# Patient Record
Sex: FEMALE | Race: WHITE | NOT HISPANIC OR LATINO | Employment: OTHER | URBAN - NONMETROPOLITAN AREA
[De-identification: names, ages, dates, MRNs, and addresses within clinical notes are randomized per-mention and may not be internally consistent; named-entity substitution may affect disease eponyms.]

---

## 2017-01-03 ENCOUNTER — OFFICE VISIT (OUTPATIENT)
Dept: NEUROLOGY | Facility: CLINIC | Age: 27
End: 2017-01-03

## 2017-01-03 VITALS
SYSTOLIC BLOOD PRESSURE: 118 MMHG | DIASTOLIC BLOOD PRESSURE: 74 MMHG | WEIGHT: 193 LBS | BODY MASS INDEX: 37.89 KG/M2 | HEART RATE: 74 BPM | HEIGHT: 60 IN

## 2017-01-03 DIAGNOSIS — Q02 MICROCEPHALY (HCC): ICD-10-CM

## 2017-01-03 DIAGNOSIS — G80.4 ATAXIC CEREBRAL PALSY (HCC): Primary | ICD-10-CM

## 2017-01-03 DIAGNOSIS — R56.9 SEIZURES (HCC): ICD-10-CM

## 2017-01-03 DIAGNOSIS — G44.89 OTHER HEADACHE SYNDROME: ICD-10-CM

## 2017-01-03 PROCEDURE — 99213 OFFICE O/P EST LOW 20 MIN: CPT | Performed by: PHYSICIAN ASSISTANT

## 2017-01-03 NOTE — MR AVS SNAPSHOT
Fiona Clay   1/3/2017 10:20 AM   Office Visit    Dept Phone:  [phone]   Encounter #:  [id number]    Provider:  TOM Conklin   Department:  [facility] NEUROLOGY                Your Full Care Plan              Your Updated Medication List          This list is accurate as of: 1/3/17 11:21 AM.  Always use your most recent med list.                CloNIDine 0.1 MG tablet   Commonly known as:  CATAPRES       PHENobarbital 20 MG/5ML solution oral solution               We Performed the Following     Ambulatory Referral to Family Practice       You Were Diagnosed With        Codes Comments    Ataxic cerebral palsy    -  Primary ICD-10-CM: G80.4  ICD-9-CM: 343.9     Microcephaly     ICD-10-CM: Q02  ICD-9-CM: 742.1     Seizures     ICD-10-CM: R56.9  ICD-9-CM: 780.39     Other headache syndrome     ICD-10-CM: G44.89  ICD-9-CM: 339.89       Instructions     None    Patient Instructions History      Upcoming Appointments     Visit Type Date Time Department    FOLLOW UP 1/3/2017 10:20 AM [facility] NEUROLOGY SPE PAD    FOLLOW UP 7/3/2017 10:40 AM [facility] NEUROLOGY SPE PAD      MyChart Signup     [facility] [employer] allows you to send messages to your doctor, view your test results, renew your prescriptions, schedule appointments, and more. To sign up, go to [employer] and click on the Sign Up Now link in the New User? box. Enter your [employer] Activation Code exactly as it appears below along with the last four digits of your Social Security Number and your Date of Birth () to complete the sign-up process. If you do not sign up before the expiration date, you must request a new code.    [employer] Activation Code: [email or web address]  Expires: 2017 11:19 AM    If you have questions, you can email [email or web address] or call [phone] to talk to our [employer] staff. Remember, [employer] is NOT to be used for urgent needs. For medical  "emergencies, dial 911.               Other Info from Your Visit           Your Appointments     Jul 03, 2017 10:40 AM CDT   Follow Up with TOM Conklin   [facility] NEUROLOGY (--)    [address] [place] [id number] [phone]           Arrive 15 minutes prior to appointment.              Allergies     Depakote [Valproic Acid]      Penicillins        Reason for Visit     Seizures No seizure since last visit. No new concerns or complaints.       Vital Signs     Blood Pressure Pulse Height Weight Body Mass Index Smoking Status    118/74 74 60\" (152.4 cm) 193 lb (87.5 kg) 37.69 kg/m2 Never Smoker      Problems and Diagnoses Noted     Cerebral palsy    Small head    Seizure    Other headache syndrome            "

## 2017-01-06 NOTE — PROGRESS NOTES
Subjective   Fiona Clay is a 26 y.o. female is here today for follow-up.    HPI Comments: This patient with developmental delay, neurocognitive defects, microcephaly, seizure disorder, cerebral palsy has been doing reasonably well with no seizures but is had increased complaints of ataxia and headache.    Seizures    This is a chronic problem. Episode onset: Childhood onset, associated with neurocognitive defects likely cerebral palsy. The problem has not changed since onset.There were more than 10 seizures. The most recent episode lasted 30 to 120 seconds. Associated symptoms include headaches and visual disturbances. Pertinent negatives include no speech difficulty, no chest pain, no cough, no nausea and no vomiting. The episode was witnessed. Aura: Unknown. The seizure(s) had no focality.   Headache    This is a recurrent problem. The current episode started more than 1 month ago. The problem occurs intermittently. The problem has been waxing and waning. The pain is located in the bilateral region. The pain does not radiate. The pain quality is not similar to prior headaches. The quality of the pain is described as aching. The pain is severe. Associated symptoms include hearing loss, seizures and weakness. Pertinent negatives include no abdominal pain, coughing, dizziness, ear pain, eye pain, fever, nausea, photophobia or vomiting. Nothing aggravates the symptoms. She has tried acetaminophen and NSAIDs for the symptoms. The treatment provided moderate relief.       The following portions of the patient's history were reviewed and updated as appropriate: allergies, current medications, past family history, past medical history, past social history, past surgical history and problem list.    Review of Systems   Constitutional: Negative for chills, fatigue and fever.   HENT: Positive for hearing loss. Negative for ear pain, nosebleeds and trouble swallowing.    Eyes: Positive for visual disturbance.  Negative for photophobia and pain.   Respiratory: Negative for cough, shortness of breath and wheezing.    Cardiovascular: Negative for chest pain and palpitations.   Gastrointestinal: Negative for abdominal pain, nausea and vomiting.   Endocrine: Negative.    Genitourinary: Negative.    Musculoskeletal: Negative.    Skin: Negative.    Allergic/Immunologic: Negative.    Neurological: Positive for seizures, weakness and headaches. Negative for dizziness and speech difficulty.        Ataxia   Hematological: Does not bruise/bleed easily.   Psychiatric/Behavioral: Negative for behavioral problems, dysphoric mood and sleep disturbance. The patient is not nervous/anxious.        Objective   Physical Exam   Constitutional: She is oriented to person, place, and time. Vital signs are normal. She appears well-developed and well-nourished. No distress.   HENT:   Head: Atraumatic.   Right Ear: External ear normal. Decreased hearing is noted.   Left Ear: External ear normal. Decreased hearing is noted.   Nose: Nose normal.   Mouth/Throat: Uvula is midline, oropharynx is clear and moist and mucous membranes are normal. Abnormal dentition.   Microcephaly   Eyes: Conjunctivae, EOM and lids are normal. Pupils are equal, round, and reactive to light. No scleral icterus.   Morphologically normal, she was too excited for funduscopic exam today   Neck: Normal range of motion and phonation normal. No spinous process tenderness and no muscular tenderness present. Carotid bruit is not present. Normal range of motion present. No thyroid mass and no thyromegaly present.   Cardiovascular: Normal rate, regular rhythm, S1 normal, S2 normal and normal heart sounds.    No murmur heard.  Pulmonary/Chest: Effort normal and breath sounds normal. No stridor. No respiratory distress. She has no wheezes. She has no rales.   Abdominal: Soft. Normal appearance. There is no tenderness.   Musculoskeletal: Normal range of motion. She exhibits no edema or  tenderness.        Lumbar back: Normal.   Lymphadenopathy:     She has no cervical adenopathy.   Neurological: She is alert and oriented to person, place, and time. She has normal strength. She displays abnormal reflex. She displays no atrophy and no tremor. A cranial nerve deficit is present. No sensory deficit. She exhibits abnormal muscle tone. Coordination abnormal. Gait normal. GCS eye subscore is 4. GCS verbal subscore is 5. GCS motor subscore is 6.   Reflex Scores:       Tricep reflexes are 3+ on the right side and 2+ on the left side.       Bicep reflexes are 3+ on the right side and 2+ on the left side.       Brachioradialis reflexes are 3+ on the right side and 2+ on the left side.       Patellar reflexes are 3+ on the right side and 2+ on the left side.       Achilles reflexes are 3+ on the right side and 2+ on the left side.  Diminished vision bilaterally and mannerisms consistent with diminished hearing bilaterally, not formally tested or evaluated today but at her known baseline.  Right-sided ataxia, mild increase in tone in reflexes on the right versus left with some general weakness on the right versus left.   Skin: Skin is warm and dry. No ecchymosis, no lesion and no rash noted. No cyanosis. Nails show no clubbing.   Psychiatric: Her speech is normal. Thought content normal. Her affect is labile. She is agitated and hyperactive. She is not actively hallucinating. Cognition and memory are impaired. She expresses impulsivity.   Mental status today is at baseline.  She is generally happy and engaging though quite superficial in conversation. She is inattentive.   Nursing note and vitals reviewed.        Assessment/Plan   Fiona was seen today for seizures.    Diagnoses and all orders for this visit:    Ataxic cerebral palsy  -     MRI Brain Without Contrast; Future  -     Ambulatory Referral to Family Practice    Microcephaly  -     MRI Brain Without Contrast; Future  -     Ambulatory Referral to  Family Practice    Seizures  -     MRI Brain Without Contrast; Future  -     Ambulatory Referral to Family Practice    Other headache syndrome  -     MRI Brain Without Contrast; Future  -     Ambulatory Referral to Family Practice      Given the patient's limited capacity to articulate her complaints in changes of diminished neuro status, increased ataxia and increased headaches superimposed on a complex history of cerebral palsy, microcephaly and seizure disorder, we have recommended that she proceed with an MRI of the brain without contrast.    The patient does not presently have a family medicine specialist and we have recommended referral to family medicine.  This referral was to establish care.    10 minutes of an outpatient 20 minute visit is spent in counseling and coordination of care with the patient and family today.        EMR Dragon transcription disclaimer:  Much of this encounter note is an electronic transcription/translation of spoken language to printed text.  The electronic translation of spoken language may permit erroneous, or at times, nonsensical words or phrases to be inadvertently transcribed.  The author has reviewed the note for such errors, however some may still exist.

## 2017-01-18 ENCOUNTER — APPOINTMENT (OUTPATIENT)
Dept: MRI IMAGING | Facility: HOSPITAL | Age: 27
End: 2017-01-18

## 2017-01-24 ENCOUNTER — OFFICE VISIT (OUTPATIENT)
Dept: INTERNAL MEDICINE | Facility: CLINIC | Age: 27
End: 2017-01-24

## 2017-01-24 VITALS
DIASTOLIC BLOOD PRESSURE: 84 MMHG | HEART RATE: 86 BPM | BODY MASS INDEX: 39.39 KG/M2 | WEIGHT: 195.4 LBS | SYSTOLIC BLOOD PRESSURE: 122 MMHG | HEIGHT: 59 IN

## 2017-01-24 DIAGNOSIS — E66.09 NON MORBID OBESITY DUE TO EXCESS CALORIES: ICD-10-CM

## 2017-01-24 DIAGNOSIS — R56.9 SEIZURES (HCC): Primary | ICD-10-CM

## 2017-01-24 DIAGNOSIS — Z00.00 ENCOUNTER FOR PREVENTIVE HEALTH EXAMINATION: ICD-10-CM

## 2017-01-24 PROCEDURE — 99203 OFFICE O/P NEW LOW 30 MIN: CPT | Performed by: INTERNAL MEDICINE

## 2017-01-24 NOTE — MR AVS SNAPSHOT
Fiona THOMPSON Obed   2017 8:00 AM   Office Visit    Dept Phone:  [phone]   Encounter #:  [id number]    Provider:  Vishal Fox DO   Department:  [facility] FAMILY MEDICINE                Your Full Care Plan              Your Updated Medication List          This list is accurate as of: 17  9:06 AM.  Always use your most recent med list.                CloNIDine 0.1 MG tablet   Commonly known as:  CATAPRES       PHENobarbital 20 MG/5ML solution oral solution               We Performed the Following     Ambulatory Referral to Obstetrics / Gynecology       You Were Diagnosed With        Codes Comments    Seizures    -  Primary ICD-10-CM: R56.9  ICD-9-CM: 780.39     Non morbid obesity due to excess calories     ICD-10-CM: E66.09  ICD-9-CM: 278.00     Encounter for preventive health examination     ICD-10-CM: Z00.00  ICD-9-CM: V70.0       Instructions     None    Patient Instructions History      Upcoming Appointments     Visit Type Date Time Department    NEW PATIENT 2017  8:00 AM [facility]    MR PAD BRAIN WO CONTRAST 2017 10:30 AM  PAD MRI [facility]    FOLLOW UP 7/3/2017 10:40 AM [facility] NEUROLOGY SPE PAD      MyChart Signup     [facility] [employer] allows you to send messages to your doctor, view your test results, renew your prescriptions, schedule appointments, and more. To sign up, go to [employer] and click on the Sign Up Now link in the New User? box. Enter your [employer] Activation Code exactly as it appears below along with the last four digits of your Social Security Number and your Date of Birth () to complete the sign-up process. If you do not sign up before the expiration date, you must request a new code.    [employer] Activation Code: [email or web address]  Expires: 2017  9:06 AM    If you have questions, you can email [email or web address] or call [phone] to talk to our [employer] staff. Remember, [employer]  "is NOT to be used for urgent needs. For medical emergencies, dial 911.               Other Info from Your Visit           Your Appointments     Jan 27, 2017 10:30 AM CST   MR pad brain wo contrast with PAD BIC MRI 2   [facility] ([place])    [address] [facility] [id number] [phone]           Arrive 30 minutes prior to exam time. Bring a list of medications currently taking. Wear clothing without metal snaps, zippers, or other metalic artifacts. Do not wear jewelry.            Jul 03, 2017 10:40 AM CDT   Follow Up with TOM Conklin   [facility] NEUROLOGY (--)    [address] [place] [address] [address]  [facility] [address] [phone]           Arrive 15 minutes prior to appointment.              Allergies     Depakote [Valproic Acid]      Penicillins        Vital Signs     Blood Pressure Pulse Height Weight Body Mass Index Smoking Status    122/84 86 59\" (149.9 cm) 195 lb 6.4 oz (88.6 kg) 39.47 kg/m2 Never Smoker      Problems and Diagnoses Noted     Non morbid obesity due to excess calories    Seizure    Preventative health care            "

## 2017-01-24 NOTE — PROGRESS NOTES
"CC: establish care for seizures and obesity    History:  Fiona Clay is a 26 y.o. female who presents today for evaluation of the above problems.  She reports she has been doing well and has not been sick in almost a year.  She continues on phenobarbital for history of seizures, though her last seizure was when she was 9 years of age.  She continues to follow with neurology and has no complaints currently.  She takes clonidine to help her with sleeping on a very rare occasion.  Regarding her weight, she states she enjoys eating things like pizza, lasagna, spaghetti.  The only vegetables she enjoys are peas, she does get fruit on a regular basis.  Regarding exercise, she states she gets it when \"forced.\"  She does drink regular Dr. Pepper daily.    ROS:  Review of Systems   Constitutional: Negative for chills and fever.   HENT: Negative for congestion and sore throat.    Eyes: Negative for visual disturbance.   Respiratory: Negative for cough and shortness of breath.    Cardiovascular: Negative for chest pain and palpitations.   Gastrointestinal: Negative for abdominal pain, constipation and nausea.   Endocrine: Negative for cold intolerance and heat intolerance.   Genitourinary: Negative for difficulty urinating and frequency.   Musculoskeletal: Negative for arthralgias and back pain.   Skin: Negative for rash.   Neurological: Negative for dizziness and headaches.   Psychiatric/Behavioral: Negative for dysphoric mood. The patient is not nervous/anxious.        Allergies   Allergen Reactions   • Depakote [Valproic Acid]    • Penicillins      Past Medical History   Diagnosis Date   • Cerebral palsy    • Hearing loss      slight   • Mental retardation    • Microcephaly    • Poor eyesight    • Seizures    • Stroke    • Vision impairment      Past Surgical History   Procedure Laterality Date   • Tympanostomy tube placement       Family History   Problem Relation Age of Onset   • Alcohol abuse Mother    • " "Arthritis Mother    • Depression Mother    • No Known Problems Sister    • No Known Problems Brother    • No Known Problems Brother    • No Known Problems Sister    • No Known Problems Sister    • No Known Problems Sister    • Alcohol abuse Maternal Grandmother    • Arthritis Maternal Grandmother    • Cancer Maternal Grandmother    • Alcohol abuse Maternal Grandfather    • Arthritis Maternal Grandfather    • Depression Maternal Grandfather    • Diabetes Maternal Grandfather       reports that she has never smoked. She has never used smokeless tobacco. She reports that she does not drink alcohol or use illicit drugs.      Current Outpatient Prescriptions:   •  CloNIDine (CATAPRES) 0.1 MG tablet, Take 0.1 mg by mouth At Night As Needed (sleep)., Disp: , Rfl:   •  PHENobarbital 20 MG/5ML solution oral solution, Take 19 mL by mouth Daily., Disp: , Rfl:     OBJECTIVE:  Visit Vitals   • /84   • Pulse 86   • Ht 59\" (149.9 cm)   • Wt 195 lb 6.4 oz (88.6 kg)   • BMI 39.47 kg/m2      Physical Exam   Constitutional: She is oriented to person, place, and time. She appears well-developed and well-nourished. No distress.   HENT:   Head: Normocephalic and atraumatic.   Right Ear: External ear normal.   Left Ear: External ear normal.   Nose: Nose normal.   Mouth/Throat: Oropharynx is clear and moist. No oropharyngeal exudate.   Eyes: EOM are normal. No scleral icterus.   Neck: Normal range of motion. Neck supple.   Cardiovascular: Normal rate, regular rhythm and normal heart sounds.    No murmur heard.  Pulmonary/Chest: Effort normal and breath sounds normal. No accessory muscle usage. No respiratory distress. She has no wheezes.   Abdominal: Soft. Bowel sounds are normal. She exhibits no distension. There is no tenderness.   Musculoskeletal: Normal range of motion.   Lymphadenopathy:     She has no cervical adenopathy.   Neurological: She is alert and oriented to person, place, and time. Coordination and gait normal.   Skin: " Skin is warm and dry. No cyanosis. Nails show no clubbing.   No jaundice   Psychiatric: She has a normal mood and affect. Her mood appears not anxious. She does not exhibit a depressed mood.       Assessment/Plan    Diagnoses and all orders for this visit:    Seizures  Stable and well-controlled on phenobarbital.  This is managed by neurology.    Non morbid obesity due to excess calories  We discussed the importance of portion control and being careful about the types of food she chooses.  I also encouraged her to get some regular exercise.    Encounter for preventive health examination  -     Ambulatory Referral to Obstetrics / Gynecology  -     Comprehensive Metabolic Panel; Future  -     Hemoglobin A1c; Future  -     Lipid Panel; Future  We will check screening labs as above in light of obesity and refer to OB/GYN for screening PAP.      An After Visit Summary was printed and given to the patient at discharge.  Return in about 1 year (around 1/24/2018) for Annual physical.         Vishal Fox D.O. 1/24/2017

## 2017-02-09 ENCOUNTER — TELEPHONE (OUTPATIENT)
Dept: NEUROLOGY | Facility: CLINIC | Age: 27
End: 2017-02-09

## 2017-02-09 RX ORDER — PHENOBARBITAL 20 MG/5ML
76 LIQUID ORAL DAILY
Qty: 450 ML | Refills: 5 | Status: SHIPPED | OUTPATIENT
Start: 2017-02-09 | End: 2017-09-18 | Stop reason: SDUPTHER

## 2017-02-09 NOTE — TELEPHONE ENCOUNTER
I did call to let Cathi know that Fiona's Phenobarbital has been called in to the [facility] on [address].

## 2017-04-25 RX ORDER — CLONIDINE HYDROCHLORIDE 0.1 MG/1
0.1 TABLET ORAL NIGHTLY PRN
Qty: 30 TABLET | Refills: 5 | Status: SHIPPED | OUTPATIENT
Start: 2017-04-25 | End: 2018-03-30 | Stop reason: SDUPTHER

## 2017-07-07 ENCOUNTER — LAB (OUTPATIENT)
Dept: LAB | Facility: HOSPITAL | Age: 27
End: 2017-07-07
Attending: INTERNAL MEDICINE

## 2017-07-07 ENCOUNTER — OFFICE VISIT (OUTPATIENT)
Dept: INTERNAL MEDICINE | Facility: CLINIC | Age: 27
End: 2017-07-07

## 2017-07-07 VITALS
HEIGHT: 59 IN | RESPIRATION RATE: 18 BRPM | BODY MASS INDEX: 39.55 KG/M2 | OXYGEN SATURATION: 95 % | HEART RATE: 77 BPM | DIASTOLIC BLOOD PRESSURE: 78 MMHG | WEIGHT: 196.2 LBS | SYSTOLIC BLOOD PRESSURE: 122 MMHG

## 2017-07-07 DIAGNOSIS — B37.2 CANDIDAL INTERTRIGO: ICD-10-CM

## 2017-07-07 DIAGNOSIS — Z00.00 ENCOUNTER FOR PREVENTIVE HEALTH EXAMINATION: ICD-10-CM

## 2017-07-07 DIAGNOSIS — E66.09 NON MORBID OBESITY DUE TO EXCESS CALORIES: ICD-10-CM

## 2017-07-07 DIAGNOSIS — R56.9 SEIZURES (HCC): ICD-10-CM

## 2017-07-07 DIAGNOSIS — G80.4 ATAXIC CEREBRAL PALSY (HCC): ICD-10-CM

## 2017-07-07 DIAGNOSIS — Z00.00 ENCOUNTER FOR PREVENTIVE HEALTH EXAMINATION: Primary | ICD-10-CM

## 2017-07-07 LAB
ALBUMIN SERPL-MCNC: 4.3 G/DL (ref 3.5–5)
ALBUMIN/GLOB SERPL: 1.2 G/DL (ref 1.1–2.5)
ALP SERPL-CCNC: 113 U/L (ref 24–120)
ALT SERPL W P-5'-P-CCNC: 54 U/L (ref 0–54)
ANION GAP SERPL CALCULATED.3IONS-SCNC: 11 MMOL/L (ref 4–13)
ARTICHOKE IGE QN: 85 MG/DL (ref 0–99)
AST SERPL-CCNC: 39 U/L (ref 7–45)
BILIRUB SERPL-MCNC: 0.3 MG/DL (ref 0.1–1)
BUN BLD-MCNC: 9 MG/DL (ref 5–21)
BUN/CREAT SERPL: 14.8 (ref 7–25)
CALCIUM SPEC-SCNC: 8.9 MG/DL (ref 8.4–10.4)
CHLORIDE SERPL-SCNC: 103 MMOL/L (ref 98–110)
CHOLEST SERPL-MCNC: 148 MG/DL (ref 130–200)
CO2 SERPL-SCNC: 25 MMOL/L (ref 24–31)
CREAT BLD-MCNC: 0.61 MG/DL (ref 0.5–1.4)
GFR SERPL CREATININE-BSD FRML MDRD: 119 ML/MIN/1.73
GLOBULIN UR ELPH-MCNC: 3.6 GM/DL
GLUCOSE BLD-MCNC: 81 MG/DL (ref 70–100)
HBA1C MFR BLD: 5.3 %
HDLC SERPL-MCNC: 45 MG/DL
LDLC/HDLC SERPL: 1.88 {RATIO}
POTASSIUM BLD-SCNC: 3.8 MMOL/L (ref 3.5–5.3)
PROT SERPL-MCNC: 7.9 G/DL (ref 6.3–8.7)
SODIUM BLD-SCNC: 139 MMOL/L (ref 135–145)
TRIGL SERPL-MCNC: 92 MG/DL (ref 0–149)

## 2017-07-07 PROCEDURE — 80061 LIPID PANEL: CPT | Performed by: INTERNAL MEDICINE

## 2017-07-07 PROCEDURE — 80053 COMPREHEN METABOLIC PANEL: CPT | Performed by: INTERNAL MEDICINE

## 2017-07-07 PROCEDURE — 99395 PREV VISIT EST AGE 18-39: CPT | Performed by: INTERNAL MEDICINE

## 2017-07-07 PROCEDURE — 83036 HEMOGLOBIN GLYCOSYLATED A1C: CPT | Performed by: INTERNAL MEDICINE

## 2017-07-07 PROCEDURE — 36415 COLL VENOUS BLD VENIPUNCTURE: CPT

## 2017-07-07 RX ORDER — NYSTATIN 100000 [USP'U]/G
POWDER TOPICAL 3 TIMES DAILY
Qty: 56.7 G | Refills: 1 | Status: SHIPPED | OUTPATIENT
Start: 2017-07-07 | End: 2018-03-30

## 2017-07-07 NOTE — PROGRESS NOTES
"CC: follow-up obesity    History:  Fiona Clay is a 26 y.o. female who presents today for follow-up for evaluation of the above:  She reports she has been feeling well without acute illness. She has not been very active and prefers to stay inside to listen to music. She has a rash under her breasts and within the folds of her skin that has been irritating her. She continues on phenobarbital with no seizures.    ROS:  Review of Systems   Constitutional: Negative for chills and fever.   Respiratory: Negative for cough and shortness of breath.    Cardiovascular: Negative for chest pain, palpitations and leg swelling.       Ms. Clay  reports that she has never smoked. She has never used smokeless tobacco. She reports that she does not drink alcohol or use illicit drugs.      Current Outpatient Prescriptions:   •  CloNIDine (CATAPRES) 0.1 MG tablet, Take 1 tablet by mouth At Night As Needed (sleep)., Disp: 30 tablet, Rfl: 5  •  PHENobarbital 20 MG/5ML solution oral solution, Take 19 mL by mouth Daily., Disp: 450 mL, Rfl: 5      OBJECTIVE:  /78  Pulse 77  Resp 18  Ht 59\" (149.9 cm)  Wt 196 lb 3.2 oz (89 kg)  SpO2 95%  BMI 39.63 kg/m2   Physical Exam   Constitutional: She is oriented to person, place, and time. She appears well-nourished. No distress.   Cardiovascular: Normal rate, regular rhythm and normal heart sounds.    No murmur heard.  Pulmonary/Chest: Effort normal and breath sounds normal. She has no wheezes.   Abdominal: Soft. There is no tenderness.   Musculoskeletal: She exhibits no edema.   Neurological: She is alert and oriented to person, place, and time.   Psychiatric: She has a normal mood and affect.       Assessment/Plan    Diagnoses and all orders for this visit:    Encounter for preventive health examination  Immunizations:      - Tetanus: Unknown or >10 years ago. Recommend to have at pharmacy or on injury.      - Influenza: Received in 2016      - Pneumovax: Once after age " 65      - Prevnar: Once after age 65      - Zostavax: Once after age 60  Colonoscopy: Due at age 50  Mammogram: Due at 50. Reports a breast lesion. Will defer to GYN.  PAP: Has referral to GYN. Recommend follow-up there given her microcephaly and their expertise in this procedure.  DEXA: DEXA scan at 65    Non morbid obesity due to excess calories  Recommended attention to portion control and being careful about the types and timing of meals for the purpose of weight management. Also recommended increased exercise.    Seizures  Ataxic cerebral palsy  No seizures on phenobarbital.    Candidal intertrigo  -     nystatin (MYCOSTATIN) 463008 UNIT/GM powder; Apply  topically 3 (Three) Times a Day.    An After Visit Summary was printed and given to the patient at discharge.  Return in about 1 year (around 7/7/2018) for Annual physical. Sooner if problems arise.         Vsihal Fox D.O. 7/7/2017

## 2017-07-11 ENCOUNTER — OFFICE VISIT (OUTPATIENT)
Dept: OBSTETRICS AND GYNECOLOGY | Facility: CLINIC | Age: 27
End: 2017-07-11

## 2017-07-11 VITALS
BODY MASS INDEX: 39.51 KG/M2 | SYSTOLIC BLOOD PRESSURE: 124 MMHG | WEIGHT: 196 LBS | DIASTOLIC BLOOD PRESSURE: 82 MMHG | HEIGHT: 59 IN

## 2017-07-11 DIAGNOSIS — N63.0 BREAST LUMP: Primary | ICD-10-CM

## 2017-07-11 PROCEDURE — 99203 OFFICE O/P NEW LOW 30 MIN: CPT | Performed by: NURSE PRACTITIONER

## 2017-07-11 NOTE — PROGRESS NOTES
Subjective   Fiona Clay is a 26 y.o. female.     History of Present Illness    The following portions of the patient's history were reviewed and updated as appropriate: allergies, current medications, past family history, past medical history, past social history, past surgical history and problem list.    Review of Systems   Constitutional: Negative for activity change, appetite change, chills, diaphoresis, fatigue, fever and unexpected weight change.   HENT: Negative for congestion, ear discharge, ear pain, facial swelling, hearing loss, mouth sores, nosebleeds, postnasal drip, rhinorrhea, sinus pressure, sneezing, sore throat, tinnitus, trouble swallowing and voice change.    Eyes: Negative for photophobia, pain, discharge, redness, itching and visual disturbance.   Respiratory: Negative for apnea, cough, choking, chest tightness and shortness of breath.    Cardiovascular: Negative for chest pain, palpitations and leg swelling.   Gastrointestinal: Negative for abdominal distention, abdominal pain, anal bleeding, blood in stool, constipation, diarrhea, nausea, rectal pain and vomiting.   Endocrine: Negative for cold intolerance and heat intolerance.   Genitourinary: Negative for decreased urine volume, difficulty urinating, dyspareunia, flank pain, frequency, genital sores, hematuria, menstrual problem, pelvic pain, urgency, vaginal bleeding, vaginal discharge and vaginal pain.   Musculoskeletal: Negative for arthralgias, back pain, joint swelling and myalgias.   Skin: Negative for color change and rash.   Allergic/Immunologic: Negative for environmental allergies.   Neurological: Negative for dizziness, syncope, weakness, numbness and headaches.   Hematological: Negative for adenopathy.   Psychiatric/Behavioral: Negative for agitation, confusion and sleep disturbance. The patient is not nervous/anxious.        Objective   Physical Exam   Constitutional: She is oriented to person, place, and time. She  appears well-developed and well-nourished.   Cardiovascular: Normal rate and regular rhythm.    Pulmonary/Chest: Effort normal and breath sounds normal. Right breast exhibits inverted nipple. Right breast exhibits no mass, no nipple discharge, no skin change and no tenderness. Left breast exhibits inverted nipple. Left breast exhibits no mass, no nipple discharge, no skin change and no tenderness.       Neurological: She is alert and oriented to person, place, and time.   Psychiatric: She has a normal mood and affect. Her behavior is normal.   Nursing note and vitals reviewed.      Assessment/Plan   Fiona was seen today for breast mass.    Diagnoses and all orders for this visit:    Breast lump  Comments:  Patient has cerebral palsy and microcephaly.  A nurse at [facility] found a lump in her left breast and referred her to me.  There was a small indention in the left breast in the area of concern only when patient is lying.  No distinct knot or mass palpated.  Mammogram with US if indicated ordered.  Will follow up pending results.    Orders:  -     Mammo Diagnostic Left With CAD; Future

## 2017-07-12 DIAGNOSIS — G80.4 ATAXIC CEREBRAL PALSY (HCC): Primary | ICD-10-CM

## 2017-07-12 DIAGNOSIS — R56.9 SEIZURES (HCC): ICD-10-CM

## 2017-07-12 DIAGNOSIS — Q02 MICROCEPHALY (HCC): ICD-10-CM

## 2017-07-18 ENCOUNTER — HOSPITAL ENCOUNTER (OUTPATIENT)
Dept: MAMMOGRAPHY | Facility: HOSPITAL | Age: 27
Discharge: HOME | End: 2017-07-18
Admitting: NURSE PRACTITIONER

## 2017-07-18 ENCOUNTER — HOSPITAL ENCOUNTER (OUTPATIENT)
Dept: ULTRASOUND IMAGING | Facility: HOSPITAL | Age: 27
Discharge: HOME | End: 2017-07-18

## 2017-07-18 DIAGNOSIS — N63.0 BREAST LUMP: ICD-10-CM

## 2017-07-18 PROCEDURE — 76642 ULTRASOUND BREAST LIMITED: CPT

## 2017-09-08 ENCOUNTER — TELEPHONE (OUTPATIENT)
Dept: OBSTETRICS AND GYNECOLOGY | Facility: CLINIC | Age: 27
End: 2017-09-08

## 2017-09-08 DIAGNOSIS — N92.0 MENORRHAGIA WITH REGULAR CYCLE: Primary | ICD-10-CM

## 2017-09-08 RX ORDER — NORETHINDRONE ACETATE AND ETHINYL ESTRADIOL 1MG-20(21)
1 KIT ORAL DAILY
Qty: 28 TABLET | Refills: 10 | Status: SHIPPED | OUTPATIENT
Start: 2017-09-08 | End: 2018-03-30

## 2017-09-08 NOTE — TELEPHONE ENCOUNTER
Mother calls stating that they received from you 2 samples of Lo Lo, and need a refill. I do not see any notation of this or discussion of contraception.  Please review

## 2017-09-25 RX ORDER — PHENOBARBITAL 20 MG/5ML
76 LIQUID ORAL DAILY
Qty: 450 ML | Refills: 2 | OUTPATIENT
Start: 2017-09-25 | End: 2018-01-02 | Stop reason: SDUPTHER

## 2018-01-02 RX ORDER — PHENOBARBITAL 20 MG/5ML
76 LIQUID ORAL DAILY
Qty: 450 ML | Refills: 2 | OUTPATIENT
Start: 2018-01-02 | End: 2018-03-30 | Stop reason: SDUPTHER

## 2018-03-30 ENCOUNTER — APPOINTMENT (OUTPATIENT)
Dept: LAB | Facility: HOSPITAL | Age: 28
End: 2018-03-30

## 2018-03-30 ENCOUNTER — OFFICE VISIT (OUTPATIENT)
Dept: NEUROLOGY | Facility: CLINIC | Age: 28
End: 2018-03-30

## 2018-03-30 VITALS
HEART RATE: 74 BPM | DIASTOLIC BLOOD PRESSURE: 80 MMHG | SYSTOLIC BLOOD PRESSURE: 124 MMHG | HEIGHT: 59 IN | WEIGHT: 200 LBS | BODY MASS INDEX: 40.32 KG/M2

## 2018-03-30 DIAGNOSIS — G80.4 ATAXIC CEREBRAL PALSY (HCC): Primary | ICD-10-CM

## 2018-03-30 DIAGNOSIS — R56.9 SEIZURES (HCC): ICD-10-CM

## 2018-03-30 LAB
ANION GAP SERPL CALCULATED.3IONS-SCNC: 12 MMOL/L (ref 4–13)
ANISOCYTOSIS BLD QL: ABNORMAL
BASOPHILS # BLD MANUAL: 0.06 10*3/MM3 (ref 0–0.2)
BASOPHILS NFR BLD AUTO: 1 % (ref 0–2)
BUN BLD-MCNC: 13 MG/DL (ref 5–21)
BUN/CREAT SERPL: 18.6 (ref 7–25)
CALCIUM SPEC-SCNC: 9.1 MG/DL (ref 8.4–10.4)
CHLORIDE SERPL-SCNC: 104 MMOL/L (ref 98–110)
CO2 SERPL-SCNC: 27 MMOL/L (ref 24–31)
CREAT BLD-MCNC: 0.7 MG/DL (ref 0.5–1.4)
DEPRECATED RDW RBC AUTO: 40.2 FL (ref 40–54)
EOSINOPHIL # BLD MANUAL: 0.06 10*3/MM3 (ref 0–0.7)
EOSINOPHIL NFR BLD MANUAL: 1 % (ref 0–7)
ERYTHROCYTE [DISTWIDTH] IN BLOOD BY AUTOMATED COUNT: 19.2 % (ref 12–15)
FERRITIN SERPL-MCNC: 3.63 NG/ML (ref 6.24–137)
GFR SERPL CREATININE-BSD FRML MDRD: 100 ML/MIN/1.73
GIANT PLATELETS: ABNORMAL
GLUCOSE BLD-MCNC: 74 MG/DL (ref 70–100)
HCT VFR BLD AUTO: 32.7 % (ref 37–47)
HGB BLD-MCNC: 9.1 G/DL (ref 12–16)
IRON 24H UR-MRATE: 24 MCG/DL (ref 42–180)
IRON SATN MFR SERPL: 6 % (ref 20–45)
LYMPHOCYTES # BLD MANUAL: 1.55 10*3/MM3 (ref 0.8–7)
LYMPHOCYTES NFR BLD MANUAL: 26 % (ref 24–44)
LYMPHOCYTES NFR BLD MANUAL: 5 % (ref 0–12)
MCH RBC QN AUTO: 17.2 PG (ref 28–32)
MCHC RBC AUTO-ENTMCNC: 27.8 G/DL (ref 33–36)
MCV RBC AUTO: 61.9 FL (ref 82–98)
MICROCYTES BLD QL: ABNORMAL
MONOCYTES # BLD AUTO: 0.3 10*3/MM3 (ref 0–1)
NEUTROPHILS # BLD AUTO: 3.71 10*3/MM3 (ref 1–11)
NEUTROPHILS NFR BLD MANUAL: 62 % (ref 37–80)
NRBC SPEC MANUAL: 1 /100 WBC (ref 0–0)
PHENOBARB SERPL-MCNC: 7.8 MCG/ML (ref 10–40)
PLATELET # BLD AUTO: 429 10*3/MM3 (ref 130–400)
PMV BLD AUTO: 9.7 FL (ref 6–12)
POTASSIUM BLD-SCNC: 3.9 MMOL/L (ref 3.5–5.3)
RBC # BLD AUTO: 5.28 10*6/MM3 (ref 4.2–5.4)
SMALL PLATELETS BLD QL SMEAR: ABNORMAL
SODIUM BLD-SCNC: 143 MMOL/L (ref 135–145)
TIBC SERPL-MCNC: 431 MCG/DL (ref 225–420)
VARIANT LYMPHS NFR BLD MANUAL: 5 % (ref 0–5)
WBC MORPH BLD: NORMAL
WBC NRBC COR # BLD: 5.98 10*3/MM3 (ref 4.8–10.8)

## 2018-03-30 PROCEDURE — 85007 BL SMEAR W/DIFF WBC COUNT: CPT | Performed by: PHYSICIAN ASSISTANT

## 2018-03-30 PROCEDURE — 80048 BASIC METABOLIC PNL TOTAL CA: CPT | Performed by: PHYSICIAN ASSISTANT

## 2018-03-30 PROCEDURE — 85025 COMPLETE CBC W/AUTO DIFF WBC: CPT | Performed by: PHYSICIAN ASSISTANT

## 2018-03-30 PROCEDURE — 36415 COLL VENOUS BLD VENIPUNCTURE: CPT | Performed by: PHYSICIAN ASSISTANT

## 2018-03-30 PROCEDURE — 83540 ASSAY OF IRON: CPT | Performed by: PHYSICIAN ASSISTANT

## 2018-03-30 PROCEDURE — 82728 ASSAY OF FERRITIN: CPT | Performed by: PHYSICIAN ASSISTANT

## 2018-03-30 PROCEDURE — 83550 IRON BINDING TEST: CPT | Performed by: PHYSICIAN ASSISTANT

## 2018-03-30 PROCEDURE — 99213 OFFICE O/P EST LOW 20 MIN: CPT | Performed by: PHYSICIAN ASSISTANT

## 2018-03-30 PROCEDURE — 80184 ASSAY OF PHENOBARBITAL: CPT | Performed by: PHYSICIAN ASSISTANT

## 2018-03-30 RX ORDER — CLONIDINE HYDROCHLORIDE 0.1 MG/1
0.1 TABLET ORAL NIGHTLY PRN
Qty: 30 TABLET | Refills: 5 | Status: SHIPPED | OUTPATIENT
Start: 2018-03-30 | End: 2018-09-20 | Stop reason: SDUPTHER

## 2018-03-30 NOTE — PROGRESS NOTES
Subjective   Fiona Clay is a 27 y.o. female is here today for follow-up.    This patient with developmental delay, neurocognitive defects, microcephaly, seizure disorder, cerebral palsy has been doing reasonably well with no seizures but is had increased complaints of ataxia and headache.      Seizures    This is a chronic problem. Episode onset: Childhood onset, associated with neurocognitive defects likely cerebral palsy. The problem has not changed since onset.There were more than 10 seizures. The most recent episode lasted 30 to 120 seconds. Associated symptoms include headaches and visual disturbances. Pertinent negatives include no speech difficulty. The episode was witnessed. Aura: Unknown. The seizure(s) had no focality.   Headache    This is a recurrent problem. The current episode started more than 1 month ago. The problem occurs intermittently. The problem has been waxing and waning. The pain is located in the bilateral region. The pain does not radiate. The pain quality is not similar to prior headaches. The quality of the pain is described as aching. The pain is severe. Associated symptoms include hearing loss and weakness. Pertinent negatives include no dizziness, ear pain, eye pain, fever, photophobia or seizures. Nothing aggravates the symptoms. She has tried acetaminophen and NSAIDs for the symptoms. The treatment provided moderate relief.       The following portions of the patient's history were reviewed and updated as appropriate: allergies, current medications, past family history, past medical history, past social history, past surgical history and problem list.    Review of Systems   Constitutional: Positive for fatigue. Negative for fever.   HENT: Positive for hearing loss. Negative for ear pain, nosebleeds and trouble swallowing.    Eyes: Positive for visual disturbance. Negative for photophobia and pain.   Respiratory: Negative.    Cardiovascular: Negative.    Gastrointestinal:  Negative.    Endocrine: Negative.    Genitourinary: Positive for menstrual problem.   Musculoskeletal: Positive for gait problem.   Skin: Negative.    Allergic/Immunologic: Negative.    Neurological: Positive for weakness and headaches. Negative for dizziness, seizures, facial asymmetry and speech difficulty.        Ataxia   Hematological: Negative.    Psychiatric/Behavioral: Negative for behavioral problems, dysphoric mood and sleep disturbance. The patient is not nervous/anxious.        Objective   Physical Exam   Constitutional: She is oriented to person, place, and time. Vital signs are normal. She appears well-developed and well-nourished. No distress.   HENT:   Head: Atraumatic.   Right Ear: External ear normal. Decreased hearing is noted.   Left Ear: External ear normal. Decreased hearing is noted.   Nose: Nose normal.   Mouth/Throat: Uvula is midline, oropharynx is clear and moist and mucous membranes are normal. Abnormal dentition.   Microcephaly   Eyes: Conjunctivae, EOM and lids are normal. Pupils are equal, round, and reactive to light. No scleral icterus.   Morphologically normal, she was too excited for funduscopic exam today   Neck: Normal range of motion and phonation normal. No spinous process tenderness and no muscular tenderness present. Carotid bruit is not present. Normal range of motion present. No thyroid mass and no thyromegaly present.   Cardiovascular: Normal rate, regular rhythm, S1 normal, S2 normal and normal heart sounds.    No murmur heard.  Pulmonary/Chest: Effort normal and breath sounds normal. No stridor. No respiratory distress. She has no wheezes. She has no rales.   Abdominal: Soft. Normal appearance. There is no tenderness.   Musculoskeletal: Normal range of motion. She exhibits no edema or tenderness.        Lumbar back: Normal.   Lymphadenopathy:     She has no cervical adenopathy.   Neurological: She is alert and oriented to person, place, and time. She has normal strength.  She displays abnormal reflex. She displays no atrophy and no tremor. A cranial nerve deficit is present. No sensory deficit. She exhibits abnormal muscle tone. Coordination abnormal. Gait normal. GCS eye subscore is 4. GCS verbal subscore is 5. GCS motor subscore is 6.   Reflex Scores:       Tricep reflexes are 3+ on the right side and 2+ on the left side.       Bicep reflexes are 3+ on the right side and 2+ on the left side.       Brachioradialis reflexes are 3+ on the right side and 2+ on the left side.       Patellar reflexes are 3+ on the right side and 2+ on the left side.       Achilles reflexes are 3+ on the right side and 2+ on the left side.  Diminished vision bilaterally and mannerisms consistent with diminished hearing bilaterally, not formally tested or evaluated today but at her known baseline.  Right-sided ataxia, mild increase in tone in reflexes on the right versus left with some general weakness on the right versus left.   Skin: Skin is warm and dry. No ecchymosis, no lesion and no rash noted. No cyanosis. Nails show no clubbing.   Psychiatric: Her speech is normal. Thought content normal. Her affect is labile. She is agitated and hyperactive. She is not actively hallucinating. Cognition and memory are impaired. She expresses impulsivity.   Mental status today is at baseline.  She is generally happy and engaging though quite superficial in conversation. She is inattentive.   Nursing note and vitals reviewed.        Assessment/Plan   Fiona was seen today for seizures.    Diagnoses and all orders for this visit:    Ataxic cerebral palsy  -     CloNIDine (CATAPRES) 0.1 MG tablet; Take 1 tablet by mouth At Night As Needed (sleep).  -     CBC & Differential  -     Iron and TIBC  -     Ferritin  -     Basic Metabolic Panel  -     Phenobarbital Level    Seizures  -     CBC & Differential  -     Iron and TIBC  -     Ferritin  -     Basic Metabolic Panel  -     Phenobarbital Level    The patient was  previously anemic.  She has not had repeat blood work recently.  She does have significant menorrhagia apparently.  We will recheck related labs including a phenobarbital level.  No changes were made in her medication regimen today.    Importance of regular follow-up is discussed today.    10 minutes of a 15 minute outpatient visit was spent in counseling and coordination of care with the patient and her family today.      Dictated utilizing Dragon dictation.

## 2018-04-02 ENCOUNTER — TELEPHONE (OUTPATIENT)
Dept: NEUROLOGY | Facility: CLINIC | Age: 28
End: 2018-04-02

## 2018-04-02 NOTE — TELEPHONE ENCOUNTER
Message left with Dr. Fox's nurse that she needs to be seen for anemia.  Mother notified of results.  She is going to call and schedule an appt with Dr. Fox.

## 2018-04-02 NOTE — TELEPHONE ENCOUNTER
----- Message from TOM Conklin sent at 3/30/2018  5:10 PM CDT -----  Please send this to Dr. Fox, please speak directly with his nurse and let her know that this needs to be addressed.  The patient likely has this iron deficiency anemia because of menorrhagia that was described to me in the office today.  Please contact the patient's mother and let her know that the patient is anemic and needs to follow-up with Dr. Fox as soon as possible.  Her phenobarbital level is a bit low, but she is stable without seizures and there is no need to adjust her dose at this time.  Thank you.

## 2018-04-11 RX ORDER — PHENOBARBITAL 20 MG/5ML
76 LIQUID ORAL DAILY
Qty: 450 ML | Refills: 2 | OUTPATIENT
Start: 2018-04-11 | End: 2018-09-20 | Stop reason: DRUGHIGH

## 2018-04-13 ENCOUNTER — OFFICE VISIT (OUTPATIENT)
Dept: INTERNAL MEDICINE | Facility: CLINIC | Age: 28
End: 2018-04-13

## 2018-04-13 VITALS
SYSTOLIC BLOOD PRESSURE: 124 MMHG | WEIGHT: 199 LBS | DIASTOLIC BLOOD PRESSURE: 84 MMHG | OXYGEN SATURATION: 99 % | TEMPERATURE: 97.8 F | BODY MASS INDEX: 40.19 KG/M2 | HEART RATE: 84 BPM

## 2018-04-13 DIAGNOSIS — D50.8 IRON DEFICIENCY ANEMIA SECONDARY TO INADEQUATE DIETARY IRON INTAKE: Primary | ICD-10-CM

## 2018-04-13 DIAGNOSIS — N92.0 MENORRHAGIA WITH REGULAR CYCLE: ICD-10-CM

## 2018-04-13 PROBLEM — D50.9 IRON DEFICIENCY ANEMIA: Status: ACTIVE | Noted: 2018-04-13

## 2018-04-13 PROCEDURE — 99214 OFFICE O/P EST MOD 30 MIN: CPT | Performed by: NURSE PRACTITIONER

## 2018-04-13 RX ORDER — FERROUS SULFATE 325(65) MG
325 TABLET ORAL
Qty: 90 TABLET | Refills: 3 | Status: SHIPPED | OUTPATIENT
Start: 2018-04-13 | End: 2019-08-06

## 2018-04-13 NOTE — PROGRESS NOTES
CC: anemia    History:  Fiona Clay is a 27 y.o. female who presents today for evaluation of the above problems.  Fiona is present with her mother today.  Fiona was found to have significant iron deficiency anemia at her appointment with neurology on March 30.  It was recommended that she follow up with our office.  Additionally I have noted that in her labs from November 2014 she also displayed iron deficiency anemia at that time as well.  She denies any blood in her stool or any black-colored stools.  She denies any hemoptysis.  I did question both Fiona and her mother regarding menorrhagia.  Fiona's menses normally last 7-8 days with several days of heavy bleeding.  Her mother states that she generally always bleeds through her sanitary pad during the night.  During the day she uses approximately 6-8 sanitary pads.    ROS:  Review of Systems   Constitutional: Negative for fatigue.   HENT: Positive for congestion. Negative for sore throat.    Respiratory:        Denies hemoptysis   Gastrointestinal: Negative for blood in stool.   Genitourinary:        Heavy menses       Allergies   Allergen Reactions   • Depakote [Valproic Acid]    • Penicillins      Past Medical History:   Diagnosis Date   • Cerebral palsy    • Hearing loss     slight   • Mental retardation    • Microcephaly    • Poor eyesight    • Seizures    • Stroke    • Vision impairment      Past Surgical History:   Procedure Laterality Date   • TYMPANOSTOMY TUBE PLACEMENT       Family History   Problem Relation Age of Onset   • Alcohol abuse Mother    • Arthritis Mother    • Depression Mother    • No Known Problems Sister    • No Known Problems Brother    • No Known Problems Brother    • No Known Problems Sister    • No Known Problems Sister    • No Known Problems Sister    • Alcohol abuse Maternal Grandmother    • Arthritis Maternal Grandmother    • Cancer Maternal Grandmother    • Alcohol abuse Maternal Grandfather    • Arthritis  Maternal Grandfather    • Depression Maternal Grandfather    • Diabetes Maternal Grandfather    • Ovarian cancer Maternal Aunt 49   • Endometrial cancer Maternal Aunt 49   • Colon cancer Neg Hx    • Breast cancer Neg Hx       reports that she has never smoked. She has never used smokeless tobacco. She reports that she does not drink alcohol or use drugs.      Current Outpatient Prescriptions:   •  CloNIDine (CATAPRES) 0.1 MG tablet, Take 1 tablet by mouth At Night As Needed (sleep)., Disp: 30 tablet, Rfl: 5  •  PHENobarbital 20 MG/5ML solution oral solution, Take 19 mL by mouth Daily., Disp: 450 mL, Rfl: 2  •  ferrous sulfate 325 (65 FE) MG tablet, Take 1 tablet by mouth 3 (Three) Times a Day With Meals., Disp: 90 tablet, Rfl: 3    OBJECTIVE:  /84 (BP Location: Right arm, Patient Position: Sitting, Cuff Size: Adult)   Pulse 84   Temp 97.8 °F (36.6 °C) (Oral)   Wt 90.3 kg (199 lb)   SpO2 99%   BMI 40.19 kg/m²    Physical Exam   Constitutional: She is oriented to person, place, and time. Vital signs are normal. She appears well-developed and well-nourished.   HENT:   Mouth/Throat: Mucous membranes are pale.   Eyes:   Conjunctivae are pale.   Cardiovascular: Normal rate.    Pulmonary/Chest: Effort normal.   Neurological: She is alert and oriented to person, place, and time.   Psychiatric: She has a normal mood and affect. Her behavior is normal.   Vitals reviewed.      Assessment/Plan    Diagnoses and all orders for this visit:    Iron deficiency anemia secondary to inadequate dietary iron intake  -     ferrous sulfate 325 (65 FE) MG tablet; Take 1 tablet by mouth 3 (Three) Times a Day With Meals.  -     CBC (No Diff); Future    Menorrhagia with regular cycle    Will initiate ferrous sulfate 3 times a day with food for the treatment of her iron deficiency anemia.  We will recheck a CBC in 2 months to evaluate the effectiveness of the treatment.  I did discuss with her mother that menorrhagia could be an  issue.  I would like to see how she responds to ferrous sulfate therapy.  In this instance, due to her neurology comorbidities I would most likely refer to gynecology in regards to management of menorrhagia.    An After Visit Summary was printed and given to the patient at discharge.  Return for Next scheduled follow up.         Fany Asencio, LAUREL  4/13/2018

## 2018-05-10 ENCOUNTER — OFFICE VISIT (OUTPATIENT)
Dept: INTERNAL MEDICINE | Facility: CLINIC | Age: 28
End: 2018-05-10

## 2018-05-10 VITALS
SYSTOLIC BLOOD PRESSURE: 131 MMHG | OXYGEN SATURATION: 99 % | DIASTOLIC BLOOD PRESSURE: 94 MMHG | WEIGHT: 200.9 LBS | HEART RATE: 88 BPM | RESPIRATION RATE: 16 BRPM | TEMPERATURE: 97.4 F | BODY MASS INDEX: 40.58 KG/M2

## 2018-05-10 DIAGNOSIS — J30.1 ACUTE SEASONAL ALLERGIC RHINITIS DUE TO POLLEN: Primary | ICD-10-CM

## 2018-05-10 PROCEDURE — 99213 OFFICE O/P EST LOW 20 MIN: CPT | Performed by: NURSE PRACTITIONER

## 2018-05-10 RX ORDER — FLUTICASONE PROPIONATE 50 MCG
2 SPRAY, SUSPENSION (ML) NASAL DAILY
Qty: 1 BOTTLE | Refills: 2 | Status: SHIPPED | OUTPATIENT
Start: 2018-05-10 | End: 2018-07-09

## 2018-05-10 RX ORDER — LORATADINE 10 MG/1
10 TABLET ORAL DAILY
Qty: 30 TABLET | Refills: 3 | Status: SHIPPED | OUTPATIENT
Start: 2018-05-10 | End: 2018-07-09

## 2018-05-10 NOTE — PROGRESS NOTES
"CC: cough and congestion    History:  Fiona Clay is a 27 y.o. female who presents today for evaluation of the above problems.    \"Minda\" and her mother are here today.  Jenny has had a cough and congestion since yesterday morning.  They note that a few days prior she also had some upset stomach and diarrhea.  This has since resolved.  She has not been taking anything for her current symptoms.  She denies any fever or chills.    ROS:  Review of Systems   Constitutional: Negative for chills and fever.   HENT: Positive for congestion, sneezing and sore throat. Negative for rhinorrhea.    Respiratory: Positive for cough.    Gastrointestinal: Negative for abdominal pain and diarrhea.       Allergies   Allergen Reactions   • Depakote [Valproic Acid]    • Penicillins      Past Medical History:   Diagnosis Date   • Cerebral palsy    • Hearing loss     slight   • Mental retardation    • Microcephaly    • Poor eyesight    • Seizures    • Stroke    • Vision impairment      Past Surgical History:   Procedure Laterality Date   • TYMPANOSTOMY TUBE PLACEMENT       Family History   Problem Relation Age of Onset   • Alcohol abuse Mother    • Arthritis Mother    • Depression Mother    • No Known Problems Sister    • No Known Problems Brother    • No Known Problems Brother    • No Known Problems Sister    • No Known Problems Sister    • No Known Problems Sister    • Alcohol abuse Maternal Grandmother    • Arthritis Maternal Grandmother    • Cancer Maternal Grandmother    • Alcohol abuse Maternal Grandfather    • Arthritis Maternal Grandfather    • Depression Maternal Grandfather    • Diabetes Maternal Grandfather    • Ovarian cancer Maternal Aunt 49   • Endometrial cancer Maternal Aunt 49   • Colon cancer Neg Hx    • Breast cancer Neg Hx       reports that she has never smoked. She has never used smokeless tobacco. She reports that she does not drink alcohol or use drugs.      Current Outpatient Prescriptions:   •  " CloNIDine (CATAPRES) 0.1 MG tablet, Take 1 tablet by mouth At Night As Needed (sleep)., Disp: 30 tablet, Rfl: 5  •  ferrous sulfate 325 (65 FE) MG tablet, Take 1 tablet by mouth 3 (Three) Times a Day With Meals., Disp: 90 tablet, Rfl: 3  •  PHENobarbital 20 MG/5ML solution oral solution, Take 19 mL by mouth Daily., Disp: 450 mL, Rfl: 2  •  fluticasone (FLONASE) 50 MCG/ACT nasal spray, 2 sprays into each nostril Daily., Disp: 1 bottle, Rfl: 2  •  loratadine (CLARITIN) 10 MG tablet, Take 1 tablet by mouth Daily., Disp: 30 tablet, Rfl: 3    OBJECTIVE:  /94 (BP Location: Left arm, Patient Position: Sitting, Cuff Size: Adult)   Pulse 88   Temp 97.4 °F (36.3 °C) (Oral)   Resp 16   Wt 91.1 kg (200 lb 14.4 oz)   SpO2 99%   BMI 40.58 kg/m²    Physical Exam    Assessment/Plan    Diagnoses and all orders for this visit:    Acute seasonal allergic rhinitis due to pollen  -     fluticasone (FLONASE) 50 MCG/ACT nasal spray; 2 sprays into each nostril Daily.  -     loratadine (CLARITIN) 10 MG tablet; Take 1 tablet by mouth Daily.    Will initiate Claritin and Flonase for allergic rhinitis.  I did advise her mother to contact our office if she did develop a fever became worse.    An After Visit Summary was printed and given to the patient at discharge.  Return for Next scheduled follow up.         LAUREL Newton  5/10/2018

## 2018-07-09 ENCOUNTER — OFFICE VISIT (OUTPATIENT)
Dept: INTERNAL MEDICINE | Facility: CLINIC | Age: 28
End: 2018-07-09

## 2018-07-09 ENCOUNTER — LAB (OUTPATIENT)
Dept: LAB | Facility: HOSPITAL | Age: 28
End: 2018-07-09
Attending: INTERNAL MEDICINE

## 2018-07-09 VITALS
HEIGHT: 59 IN | OXYGEN SATURATION: 97 % | DIASTOLIC BLOOD PRESSURE: 78 MMHG | WEIGHT: 201.1 LBS | RESPIRATION RATE: 16 BRPM | BODY MASS INDEX: 40.54 KG/M2 | HEART RATE: 57 BPM | SYSTOLIC BLOOD PRESSURE: 130 MMHG

## 2018-07-09 DIAGNOSIS — B37.2 CANDIDAL INTERTRIGO: ICD-10-CM

## 2018-07-09 DIAGNOSIS — Z00.00 ENCOUNTER FOR PREVENTIVE HEALTH EXAMINATION: ICD-10-CM

## 2018-07-09 DIAGNOSIS — IMO0001 CLASS 3 OBESITY DUE TO EXCESS CALORIES WITHOUT SERIOUS COMORBIDITY WITH BODY MASS INDEX (BMI) OF 40.0 TO 44.9 IN ADULT: ICD-10-CM

## 2018-07-09 DIAGNOSIS — D50.8 IRON DEFICIENCY ANEMIA SECONDARY TO INADEQUATE DIETARY IRON INTAKE: ICD-10-CM

## 2018-07-09 DIAGNOSIS — G80.4 ATAXIC CEREBRAL PALSY (HCC): ICD-10-CM

## 2018-07-09 DIAGNOSIS — R56.9 SEIZURES (HCC): ICD-10-CM

## 2018-07-09 DIAGNOSIS — Z00.00 ENCOUNTER FOR PREVENTIVE HEALTH EXAMINATION: Primary | ICD-10-CM

## 2018-07-09 DIAGNOSIS — R71.8 MICROCYTOSIS: Primary | ICD-10-CM

## 2018-07-09 LAB
ALBUMIN SERPL-MCNC: 4.3 G/DL (ref 3.5–5)
ALBUMIN/GLOB SERPL: 1.2 G/DL (ref 1.1–2.5)
ALP SERPL-CCNC: 113 U/L (ref 24–120)
ALT SERPL W P-5'-P-CCNC: 28 U/L (ref 0–54)
ANION GAP SERPL CALCULATED.3IONS-SCNC: 13 MMOL/L (ref 4–13)
ARTICHOKE IGE QN: 91 MG/DL (ref 0–99)
AST SERPL-CCNC: 24 U/L (ref 7–45)
BILIRUB SERPL-MCNC: 0.2 MG/DL (ref 0.1–1)
BUN BLD-MCNC: 13 MG/DL (ref 5–21)
BUN/CREAT SERPL: 21.3 (ref 7–25)
CALCIUM SPEC-SCNC: 9.1 MG/DL (ref 8.4–10.4)
CHLORIDE SERPL-SCNC: 103 MMOL/L (ref 98–110)
CHOLEST SERPL-MCNC: 154 MG/DL (ref 130–200)
CO2 SERPL-SCNC: 26 MMOL/L (ref 24–31)
CREAT BLD-MCNC: 0.61 MG/DL (ref 0.5–1.4)
DEPRECATED RDW RBC AUTO: 51.1 FL (ref 40–54)
ERYTHROCYTE [DISTWIDTH] IN BLOOD BY AUTOMATED COUNT: 18 % (ref 12–15)
GFR SERPL CREATININE-BSD FRML MDRD: 118 ML/MIN/1.73
GLOBULIN UR ELPH-MCNC: 3.6 GM/DL
GLUCOSE BLD-MCNC: 85 MG/DL (ref 70–100)
HBA1C MFR BLD: 4.7 %
HCT VFR BLD AUTO: 43.1 % (ref 37–47)
HDLC SERPL-MCNC: 41 MG/DL
HGB BLD-MCNC: 13.8 G/DL (ref 12–16)
IRON 24H UR-MRATE: 150 MCG/DL (ref 42–180)
IRON SATN MFR SERPL: 45 % (ref 20–45)
LDLC/HDLC SERPL: 2.28 {RATIO}
MCH RBC QN AUTO: 24.6 PG (ref 28–32)
MCHC RBC AUTO-ENTMCNC: 32 G/DL (ref 33–36)
MCV RBC AUTO: 77 FL (ref 82–98)
PHENOBARB SERPL-MCNC: 6.4 MCG/ML (ref 10–40)
PLATELET # BLD AUTO: 277 10*3/MM3 (ref 130–400)
PMV BLD AUTO: 9.7 FL (ref 6–12)
POTASSIUM BLD-SCNC: 4.2 MMOL/L (ref 3.5–5.3)
PROT SERPL-MCNC: 7.9 G/DL (ref 6.3–8.7)
RBC # BLD AUTO: 5.6 10*6/MM3 (ref 4.2–5.4)
SODIUM BLD-SCNC: 142 MMOL/L (ref 135–145)
TIBC SERPL-MCNC: 333 MCG/DL (ref 225–420)
TRIGL SERPL-MCNC: 98 MG/DL (ref 0–149)
WBC NRBC COR # BLD: 7.75 10*3/MM3 (ref 4.8–10.8)

## 2018-07-09 PROCEDURE — 83540 ASSAY OF IRON: CPT | Performed by: INTERNAL MEDICINE

## 2018-07-09 PROCEDURE — 85027 COMPLETE CBC AUTOMATED: CPT | Performed by: INTERNAL MEDICINE

## 2018-07-09 PROCEDURE — 83550 IRON BINDING TEST: CPT | Performed by: INTERNAL MEDICINE

## 2018-07-09 PROCEDURE — 83036 HEMOGLOBIN GLYCOSYLATED A1C: CPT | Performed by: INTERNAL MEDICINE

## 2018-07-09 PROCEDURE — 80061 LIPID PANEL: CPT | Performed by: INTERNAL MEDICINE

## 2018-07-09 PROCEDURE — 80053 COMPREHEN METABOLIC PANEL: CPT | Performed by: INTERNAL MEDICINE

## 2018-07-09 PROCEDURE — 80184 ASSAY OF PHENOBARBITAL: CPT | Performed by: INTERNAL MEDICINE

## 2018-07-09 PROCEDURE — 99395 PREV VISIT EST AGE 18-39: CPT | Performed by: INTERNAL MEDICINE

## 2018-07-09 PROCEDURE — 36415 COLL VENOUS BLD VENIPUNCTURE: CPT

## 2018-07-09 RX ORDER — NYSTATIN 100000 [USP'U]/G
POWDER TOPICAL 4 TIMES DAILY
Qty: 45 G | Refills: 1 | Status: SHIPPED | OUTPATIENT
Start: 2018-07-09 | End: 2019-08-06 | Stop reason: SDUPTHER

## 2018-07-09 NOTE — PROGRESS NOTES
CC: f/u preventive health    History:  Fiona Clay is a 27 y.o. female who presents today for evaluation of the above problems.  She notes she has been doing well without any acute illness.  She presents today with complaints of a rash in the folds of her abdominal area.  She continues on phenobarbital and denies any recent seizures.    ROS:  Review of Systems   Constitutional: Negative for chills and fever.   HENT: Negative for congestion and sore throat.    Eyes: Negative for visual disturbance.   Respiratory: Negative for cough and shortness of breath.    Cardiovascular: Negative for chest pain, palpitations and leg swelling.   Gastrointestinal: Negative for abdominal pain, constipation and nausea.   Endocrine: Negative for cold intolerance and heat intolerance.   Genitourinary: Negative for difficulty urinating and frequency.   Musculoskeletal: Negative for arthralgias and back pain.   Skin: Positive for rash.   Neurological: Negative for dizziness and headaches.   Psychiatric/Behavioral: Negative for dysphoric mood. The patient is not nervous/anxious.        Allergies   Allergen Reactions   • Depakote [Valproic Acid]    • Penicillins      Past Medical History:   Diagnosis Date   • Cerebral palsy (CMS/HCC)    • Hearing loss     slight   • Mental retardation    • Microcephaly (CMS/HCC)    • Poor eyesight    • Seizures (CMS/HCC)    • Stroke (CMS/HCC)    • Vision impairment      Past Surgical History:   Procedure Laterality Date   • TYMPANOSTOMY TUBE PLACEMENT       Family History   Problem Relation Age of Onset   • Alcohol abuse Mother    • Arthritis Mother    • Depression Mother    • No Known Problems Sister    • No Known Problems Brother    • No Known Problems Brother    • No Known Problems Sister    • No Known Problems Sister    • No Known Problems Sister    • Alcohol abuse Maternal Grandmother    • Arthritis Maternal Grandmother    • Cancer Maternal Grandmother    • Alcohol abuse Maternal Grandfather   "  • Arthritis Maternal Grandfather    • Depression Maternal Grandfather    • Diabetes Maternal Grandfather    • Ovarian cancer Maternal Aunt 49   • Endometrial cancer Maternal Aunt 49   • Colon cancer Neg Hx    • Breast cancer Neg Hx       reports that she has never smoked. She has never used smokeless tobacco. She reports that she does not drink alcohol or use drugs.      Current Outpatient Prescriptions:   •  CloNIDine (CATAPRES) 0.1 MG tablet, Take 1 tablet by mouth At Night As Needed (sleep)., Disp: 30 tablet, Rfl: 5  •  ferrous sulfate 325 (65 FE) MG tablet, Take 1 tablet by mouth 3 (Three) Times a Day With Meals., Disp: 90 tablet, Rfl: 3  •  PHENobarbital 20 MG/5ML solution oral solution, Take 19 mL by mouth Daily., Disp: 450 mL, Rfl: 2  •  nystatin (MYCOSTATIN) 283769 UNIT/GM powder, Apply  topically 4 (Four) Times a Day., Disp: 45 g, Rfl: 1    OBJECTIVE:  /78 (BP Location: Left arm, Patient Position: Sitting, Cuff Size: Adult)   Pulse 57   Resp 16   Ht 149.9 cm (59\")   Wt 91.2 kg (201 lb 1.6 oz)   SpO2 97%   Breastfeeding? No   BMI 40.62 kg/m²    Physical Exam   Constitutional: She is oriented to person, place, and time. She appears well-developed and well-nourished. No distress.   HENT:   Head: Normocephalic and atraumatic.   Nose: Nose normal.   Mouth/Throat: No oropharyngeal exudate.   Eyes: EOM are normal. No scleral icterus.   Neck: Normal range of motion. Neck supple.   Cardiovascular: Normal rate, regular rhythm and normal heart sounds.    No murmur heard.  Pulmonary/Chest: Effort normal and breath sounds normal. No accessory muscle usage. No respiratory distress. She has no wheezes.   Abdominal: Soft. Bowel sounds are normal. She exhibits no distension. There is no tenderness.   Musculoskeletal: Normal range of motion.   Lymphadenopathy:     She has no cervical adenopathy.   Neurological: She is alert and oriented to person, place, and time. Coordination and gait normal.   Skin: Skin is " warm and dry. No cyanosis. Nails show no clubbing.   No jaundice. Changes of intertrigo under abdominal pannus   Psychiatric: She has a normal mood and affect. Her mood appears not anxious. She does not exhibit a depressed mood.       Assessment/Plan    Diagnoses and all orders for this visit:    Encounter for preventive health examination  -     Comprehensive Metabolic Panel; Future  -     CBC (No Diff); Future  -     Hemoglobin A1c; Future  -     Lipid Panel; Future  Immunizations:      - Tetanus: Unknown or >10 years ago. Recommend to have at pharmacy or on injury.      - Influenza: Recommend yearly.      - Pneumovax: Once after age 65      - Prevnar: Once after age 65      - Zostavax: Once after age 60  CRC screening: Due at 50  Mammogram: Due at 50  PAP: declined secondary to CP and difficulty with exam.   DEXA: DEXA scan at 65    Iron deficiency anemia secondary to inadequate dietary iron intake  -     CBC (No Diff); Future  Check labs as above.  She does continue on iron therapy.    Candidal intertrigo  -     nystatin (MYCOSTATIN) [id number] UNIT/GM powder; Apply  topically 4 (Four) Times a Day.  Nystatin powder to be used in her areas of folds.    Seizures (CMS/HCC)  Ataxic cerebral palsy (CMS/HCC)  -     Phenobarbital level; Future  Well controlled on phenobarbital without any seizure activity.  We will check phenobarbital level with labs.  Following with neurology.    Class 3 obesity due to excess calories without serious comorbidity with body mass index (BMI) of 40.0 to 44.9 in adult (CMS/HCC)  Recommended attention to portion control and being careful about the types and timing of meals for the purpose of weight management.      An After Visit Summary was printed and given to the patient at discharge.  Return in about 1 year (around 7/9/2019) for Annual physical.         Vishal Fox D.O. 7/9/2018

## 2018-09-20 ENCOUNTER — OFFICE VISIT (OUTPATIENT)
Dept: NEUROLOGY | Facility: CLINIC | Age: 28
End: 2018-09-20

## 2018-09-20 VITALS
BODY MASS INDEX: 40.72 KG/M2 | DIASTOLIC BLOOD PRESSURE: 82 MMHG | SYSTOLIC BLOOD PRESSURE: 128 MMHG | HEART RATE: 66 BPM | WEIGHT: 202 LBS | HEIGHT: 59 IN

## 2018-09-20 DIAGNOSIS — R56.9 SEIZURES (HCC): ICD-10-CM

## 2018-09-20 DIAGNOSIS — Q02 MICROCEPHALY (HCC): ICD-10-CM

## 2018-09-20 DIAGNOSIS — G80.4 ATAXIC CEREBRAL PALSY (HCC): Primary | ICD-10-CM

## 2018-09-20 PROCEDURE — 99213 OFFICE O/P EST LOW 20 MIN: CPT | Performed by: PHYSICIAN ASSISTANT

## 2018-09-20 RX ORDER — CLONIDINE HYDROCHLORIDE 0.1 MG/1
0.1 TABLET ORAL NIGHTLY PRN
Qty: 30 TABLET | Refills: 5 | Status: SHIPPED | OUTPATIENT
Start: 2018-09-20 | End: 2019-10-10 | Stop reason: SDUPTHER

## 2018-09-20 NOTE — PROGRESS NOTES
Subjective   Fiona Clay is a 28 y.o. female is here today for follow-up.    This patient with developmental delay, neurocognitive defects, microcephaly, seizure disorder, cerebral palsy has been doing reasonably well with no seizures.  Her complaints of ataxia and headache are improved over her last visit.      Seizures    This is a chronic problem. Episode onset: Childhood onset, associated with neurocognitive defects likely cerebral palsy. The problem has not changed since onset.There were more than 10 seizures. The most recent episode lasted 30 to 120 seconds. Associated symptoms include headaches and visual disturbances. Pertinent negatives include no speech difficulty. The episode was witnessed. Aura: Unknown. The seizure(s) had no focality.   Headache    This is a recurrent problem. The current episode started more than 1 month ago. The problem occurs intermittently. The problem has been waxing and waning. The pain is located in the bilateral region. The pain does not radiate. The pain quality is not similar to prior headaches. The quality of the pain is described as aching. The pain is mild. Associated symptoms include hearing loss and weakness. Pertinent negatives include no dizziness, ear pain, eye pain, fever, photophobia or seizures. Nothing aggravates the symptoms. She has tried acetaminophen and NSAIDs for the symptoms. The treatment provided significant relief.       The following portions of the patient's history were reviewed and updated as appropriate: allergies, current medications, past family history, past medical history, past social history, past surgical history and problem list.    Review of Systems   Constitutional: Negative for fever.   HENT: Positive for hearing loss. Negative for ear pain and trouble swallowing.    Eyes: Positive for visual disturbance. Negative for photophobia and pain.   Respiratory: Negative.    Cardiovascular: Negative.    Gastrointestinal: Negative.     Endocrine: Negative.    Genitourinary: Positive for menstrual problem.   Musculoskeletal: Positive for gait problem.   Skin: Negative.    Allergic/Immunologic: Negative.    Neurological: Positive for weakness and headaches. Negative for dizziness, seizures, facial asymmetry and speech difficulty.        Ataxia   Hematological: Negative.    Psychiatric/Behavioral: Negative for behavioral problems, dysphoric mood and sleep disturbance. The patient is nervous/anxious.        Objective   Physical Exam   Constitutional: She is oriented to person, place, and time. Vital signs are normal. She appears well-developed and well-nourished. No distress.   HENT:   Head: Atraumatic.   Right Ear: External ear normal. Decreased hearing is noted.   Left Ear: External ear normal. Decreased hearing is noted.   Nose: Nose normal.   Mouth/Throat: Uvula is midline, oropharynx is clear and moist and mucous membranes are normal. Abnormal dentition.   Microcephaly   Eyes: Pupils are equal, round, and reactive to light. Conjunctivae, EOM and lids are normal. No scleral icterus.   Morphologically normal, she was too excited for funduscopic exam today   Neck: Normal range of motion and phonation normal. No spinous process tenderness and no muscular tenderness present. Carotid bruit is not present. Normal range of motion present. No thyroid mass and no thyromegaly present.   Cardiovascular: Normal rate, regular rhythm, S1 normal, S2 normal and normal heart sounds.    No murmur heard.  Pulmonary/Chest: Effort normal and breath sounds normal. No stridor. No respiratory distress. She has no wheezes. She has no rales.   Abdominal: Soft. Normal appearance. There is no tenderness.   Musculoskeletal: Normal range of motion. She exhibits no edema or tenderness.        Lumbar back: Normal.   Lymphadenopathy:     She has no cervical adenopathy.   Neurological: She is alert and oriented to person, place, and time. She has normal strength. She displays  abnormal reflex. She displays no atrophy and no tremor. A cranial nerve deficit is present. No sensory deficit. She exhibits abnormal muscle tone. Coordination abnormal. Gait normal. GCS eye subscore is 4. GCS verbal subscore is 5. GCS motor subscore is 6.   Reflex Scores:       Tricep reflexes are 3+ on the right side and 2+ on the left side.       Bicep reflexes are 3+ on the right side and 2+ on the left side.       Brachioradialis reflexes are 3+ on the right side and 2+ on the left side.       Patellar reflexes are 3+ on the right side and 2+ on the left side.       Achilles reflexes are 3+ on the right side and 2+ on the left side.  Diminished vision bilaterally and mannerisms consistent with diminished hearing bilaterally, not formally tested or evaluated today but at her known baseline.  Right-sided ataxia, mild increase in tone and reflexes on the right versus left with some general weakness on the right versus left.   Skin: Skin is warm and dry. No ecchymosis, no lesion and no rash noted. No cyanosis. Nails show no clubbing.   Psychiatric: Her speech is normal. Thought content normal. Her affect is labile. She is hyperactive. Cognition and memory are impaired. She expresses impulsivity.   Mental status today is at baseline.  She is generally happy and engaging though quite superficial in conversation.   Nursing note and vitals reviewed.        Assessment/Plan   Fiona was seen today for seizures and headache.    Diagnoses and all orders for this visit:    Ataxic cerebral palsy (CMS/HCC)  -     CloNIDine (CATAPRES) 0.1 MG tablet; Take 1 tablet by mouth At Night As Needed (sleep).    Seizures (CMS/HCC)    Microcephaly (CMS/HCC)    Patient is swallowing pills very well and like to change to phenobarbital tablets rather than liquid.  We will convert her to phenobarbital tablets, 64.8 mg once daily.    No other changes were made in her medication regimen today.    10 minutes of a 15 minute outpatient visit  was spent in counseling and coordination of care today.    Dictated utilizing Dragon dictation.

## 2018-10-01 RX ORDER — PHENOBARBITAL 64.8 MG/1
64.8 TABLET ORAL DAILY
Qty: 30 TABLET | Refills: 2 | OUTPATIENT
Start: 2018-10-01 | End: 2019-01-02 | Stop reason: SDUPTHER

## 2019-01-03 RX ORDER — PHENOBARBITAL 64.8 MG/1
64.8 TABLET ORAL DAILY
Qty: 30 TABLET | Refills: 2 | OUTPATIENT
Start: 2019-01-03 | End: 2019-04-19 | Stop reason: SDUPTHER

## 2019-03-26 ENCOUNTER — OFFICE VISIT (OUTPATIENT)
Dept: NEUROLOGY | Facility: CLINIC | Age: 29
End: 2019-03-26

## 2019-03-26 VITALS
DIASTOLIC BLOOD PRESSURE: 72 MMHG | WEIGHT: 230 LBS | SYSTOLIC BLOOD PRESSURE: 128 MMHG | HEIGHT: 59 IN | BODY MASS INDEX: 46.37 KG/M2 | HEART RATE: 70 BPM

## 2019-03-26 DIAGNOSIS — R56.9 SEIZURES (HCC): ICD-10-CM

## 2019-03-26 DIAGNOSIS — G80.4 ATAXIC CEREBRAL PALSY (HCC): Primary | ICD-10-CM

## 2019-03-26 DIAGNOSIS — Q02 MICROCEPHALY (HCC): ICD-10-CM

## 2019-03-26 PROCEDURE — 99213 OFFICE O/P EST LOW 20 MIN: CPT | Performed by: PHYSICIAN ASSISTANT

## 2019-03-26 NOTE — PROGRESS NOTES
Subjective   Fiona Clay is a 28 y.o. female is here today for follow-up.    This patient with developmental delay, neurocognitive defects, microcephaly, seizure disorder, cerebral palsy has been doing reasonably well with no seizures.      Seizures    This is a chronic problem. Episode onset: Childhood onset, associated with neurocognitive defects likely cerebral palsy. The problem has not changed since onset.There were more than 10 seizures. The most recent episode lasted 30 to 120 seconds. Associated symptoms include headaches and visual disturbances. The episode was witnessed. Aura: Unknown. The seizure(s) had no focality.   Headache    This is a recurrent problem. The current episode started more than 1 month ago. The problem occurs intermittently. The problem has been resolved. The pain is located in the bilateral region. The pain does not radiate. The pain quality is not similar to prior headaches. The quality of the pain is described as aching. The pain is mild. Associated symptoms include hearing loss and weakness. Pertinent negatives include no seizures. Nothing aggravates the symptoms. She has tried acetaminophen and NSAIDs for the symptoms. The treatment provided significant relief.       The following portions of the patient's history were reviewed and updated as appropriate: allergies, current medications, past family history, past medical history, past social history, past surgical history and problem list.    Review of Systems   Constitutional: Negative for fatigue.   HENT: Positive for hearing loss. Negative for trouble swallowing.    Eyes: Positive for visual disturbance.   Respiratory: Negative.    Cardiovascular: Negative.    Gastrointestinal: Negative.    Endocrine: Negative.    Genitourinary: Positive for menstrual problem.   Musculoskeletal: Positive for gait problem.   Skin: Negative.    Allergic/Immunologic: Negative.    Neurological: Positive for weakness and headaches. Negative  for seizures.        Ataxia   Hematological: Negative.    Psychiatric/Behavioral: Negative for behavioral problems and sleep disturbance. The patient is nervous/anxious.        Objective   Physical Exam   Constitutional: She is oriented to person, place, and time. Vital signs are normal. She appears well-developed and well-nourished. No distress.   HENT:   Head: Atraumatic.   Right Ear: External ear normal. Decreased hearing is noted.   Left Ear: External ear normal. Decreased hearing is noted.   Nose: Nose normal.   Mouth/Throat: Uvula is midline, oropharynx is clear and moist and mucous membranes are normal. Abnormal dentition.   Microcephaly   Eyes: Conjunctivae, EOM and lids are normal. Pupils are equal, round, and reactive to light. No scleral icterus.   Morphologically normal, she was too excited for funduscopic exam today   Neck: Normal range of motion and phonation normal. Carotid bruit is not present.   Cardiovascular: Normal rate, regular rhythm, S1 normal, S2 normal and normal heart sounds.   No murmur heard.  Pulmonary/Chest: Effort normal and breath sounds normal. No stridor. No respiratory distress. She has no wheezes. She has no rales.   Abdominal: Soft. Normal appearance. There is no tenderness.   Musculoskeletal: Normal range of motion. She exhibits no edema or tenderness.        Lumbar back: Normal.   Lymphadenopathy:     She has no cervical adenopathy.   Neurological: She is alert and oriented to person, place, and time. She has normal strength. She displays abnormal reflex. She displays no atrophy and no tremor. A cranial nerve deficit is present. No sensory deficit. She exhibits abnormal muscle tone. Coordination and gait abnormal. GCS eye subscore is 4. GCS verbal subscore is 5. GCS motor subscore is 6.   Reflex Scores:       Tricep reflexes are 3+ on the right side and 2+ on the left side.       Bicep reflexes are 3+ on the right side and 2+ on the left side.       Brachioradialis reflexes are  3+ on the right side and 2+ on the left side.       Patellar reflexes are 3+ on the right side and 2+ on the left side.       Achilles reflexes are 3+ on the right side and 2+ on the left side.  Diminished vision bilaterally and mannerisms consistent with diminished hearing bilaterally, not formally tested or evaluated today but at her known baseline.  Right-sided ataxia, mild increase in tone and reflexes on the right versus left with some general weakness on the right versus left.   Skin: Skin is warm and dry. No ecchymosis, no lesion and no rash noted. No cyanosis. Nails show no clubbing.   Psychiatric: Her affect is labile. Her speech is delayed. She is hyperactive. Cognition and memory are impaired. She expresses impulsivity.   Mental status today is at baseline.  She is generally happy and engaging though quite superficial in conversation.   Nursing note and vitals reviewed.        Assessment/Plan   Fiona was seen today for ataxic cerebral palsy, seizures and headache.    Diagnoses and all orders for this visit:    Ataxic cerebral palsy (CMS/HCC)    Seizures (CMS/HCC)    Microcephaly (CMS/HCC)    Neurologically stable.  No changes are recommended today.    The patient appears clinically stable and well cared for.    10 minutes of a 15-minute outpatient visit was spent in counseling and coordination of care with the patient and her family today.    Dictated utilizing Dragon dictation.

## 2019-05-01 RX ORDER — PHENOBARBITAL 64.8 MG/1
64.8 TABLET ORAL DAILY
Qty: 30 TABLET | Refills: 2 | OUTPATIENT
Start: 2019-05-01 | End: 2019-08-26 | Stop reason: SDUPTHER

## 2019-08-06 ENCOUNTER — OFFICE VISIT (OUTPATIENT)
Dept: INTERNAL MEDICINE | Facility: CLINIC | Age: 29
End: 2019-08-06

## 2019-08-06 ENCOUNTER — LAB (OUTPATIENT)
Dept: LAB | Facility: HOSPITAL | Age: 29
End: 2019-08-06

## 2019-08-06 VITALS
DIASTOLIC BLOOD PRESSURE: 82 MMHG | SYSTOLIC BLOOD PRESSURE: 128 MMHG | BODY MASS INDEX: 46.19 KG/M2 | OXYGEN SATURATION: 94 % | WEIGHT: 229.1 LBS | HEART RATE: 82 BPM | HEIGHT: 59 IN

## 2019-08-06 DIAGNOSIS — Z00.01 ANNUAL VISIT FOR GENERAL ADULT MEDICAL EXAMINATION WITH ABNORMAL FINDINGS: Primary | ICD-10-CM

## 2019-08-06 DIAGNOSIS — D50.8 IRON DEFICIENCY ANEMIA SECONDARY TO INADEQUATE DIETARY IRON INTAKE: ICD-10-CM

## 2019-08-06 DIAGNOSIS — J30.1 ACUTE SEASONAL ALLERGIC RHINITIS DUE TO POLLEN: ICD-10-CM

## 2019-08-06 DIAGNOSIS — B37.2 CANDIDAL INTERTRIGO: ICD-10-CM

## 2019-08-06 DIAGNOSIS — Z00.01 ANNUAL VISIT FOR GENERAL ADULT MEDICAL EXAMINATION WITH ABNORMAL FINDINGS: ICD-10-CM

## 2019-08-06 DIAGNOSIS — N92.0 MENORRHAGIA WITH REGULAR CYCLE: ICD-10-CM

## 2019-08-06 DIAGNOSIS — Q02 MICROCEPHALY (HCC): ICD-10-CM

## 2019-08-06 DIAGNOSIS — E66.01 CLASS 3 SEVERE OBESITY DUE TO EXCESS CALORIES WITHOUT SERIOUS COMORBIDITY WITH BODY MASS INDEX (BMI) OF 45.0 TO 49.9 IN ADULT (HCC): ICD-10-CM

## 2019-08-06 DIAGNOSIS — R56.9 SEIZURES (HCC): ICD-10-CM

## 2019-08-06 DIAGNOSIS — G80.4 ATAXIC CEREBRAL PALSY (HCC): ICD-10-CM

## 2019-08-06 LAB
ALBUMIN SERPL-MCNC: 4.5 G/DL (ref 3.5–5)
ALBUMIN/GLOB SERPL: 1.3 G/DL (ref 1.1–2.5)
ALP SERPL-CCNC: 122 U/L (ref 24–120)
ALT SERPL W P-5'-P-CCNC: 36 U/L (ref 0–54)
ANION GAP SERPL CALCULATED.3IONS-SCNC: 8 MMOL/L (ref 4–13)
ARTICHOKE IGE QN: 108 MG/DL (ref 0–99)
AST SERPL-CCNC: 34 U/L (ref 7–45)
BILIRUB SERPL-MCNC: 0.3 MG/DL (ref 0.1–1)
BUN BLD-MCNC: 11 MG/DL (ref 5–21)
BUN/CREAT SERPL: 18 (ref 7–25)
CALCIUM SPEC-SCNC: 9.8 MG/DL (ref 8.4–10.4)
CHLORIDE SERPL-SCNC: 103 MMOL/L (ref 98–110)
CHOLEST SERPL-MCNC: 178 MG/DL (ref 130–200)
CO2 SERPL-SCNC: 28 MMOL/L (ref 24–31)
CREAT BLD-MCNC: 0.61 MG/DL (ref 0.5–1.4)
DEPRECATED RDW RBC AUTO: 38.5 FL (ref 37–54)
ERYTHROCYTE [DISTWIDTH] IN BLOOD BY AUTOMATED COUNT: 14.7 % (ref 12.3–15.4)
FERRITIN SERPL-MCNC: 7.52 NG/ML (ref 6.24–137)
GFR SERPL CREATININE-BSD FRML MDRD: 116 ML/MIN/1.73
GLOBULIN UR ELPH-MCNC: 3.6 GM/DL
GLUCOSE BLD-MCNC: 110 MG/DL (ref 70–100)
HBA1C MFR BLD: 5.2 % (ref 4.8–5.9)
HCT VFR BLD AUTO: 40.4 % (ref 34–46.6)
HDLC SERPL-MCNC: 41 MG/DL
HGB BLD-MCNC: 12.7 G/DL (ref 12–15.9)
IRON 24H UR-MRATE: 36 MCG/DL (ref 42–180)
IRON SATN MFR SERPL: 10 % (ref 20–45)
LDLC/HDLC SERPL: 2.32 {RATIO}
MCH RBC QN AUTO: 23.2 PG (ref 26.6–33)
MCHC RBC AUTO-ENTMCNC: 31.4 G/DL (ref 31.5–35.7)
MCV RBC AUTO: 73.7 FL (ref 79–97)
PHENOBARB SERPL-MCNC: 9.2 MCG/ML (ref 10–40)
PLATELET # BLD AUTO: 361 10*3/MM3 (ref 140–450)
PMV BLD AUTO: 9.2 FL (ref 6–12)
POTASSIUM BLD-SCNC: 3.8 MMOL/L (ref 3.5–5.3)
PROT SERPL-MCNC: 8.1 G/DL (ref 6.3–8.7)
RBC # BLD AUTO: 5.48 10*6/MM3 (ref 3.77–5.28)
SODIUM BLD-SCNC: 139 MMOL/L (ref 135–145)
TIBC SERPL-MCNC: 370 MCG/DL (ref 225–420)
TRIGL SERPL-MCNC: 210 MG/DL (ref 0–149)
WBC NRBC COR # BLD: 8.84 10*3/MM3 (ref 3.4–10.8)

## 2019-08-06 PROCEDURE — 83036 HEMOGLOBIN GLYCOSYLATED A1C: CPT | Performed by: INTERNAL MEDICINE

## 2019-08-06 PROCEDURE — 80184 ASSAY OF PHENOBARBITAL: CPT | Performed by: INTERNAL MEDICINE

## 2019-08-06 PROCEDURE — 99395 PREV VISIT EST AGE 18-39: CPT | Performed by: INTERNAL MEDICINE

## 2019-08-06 PROCEDURE — 85027 COMPLETE CBC AUTOMATED: CPT | Performed by: INTERNAL MEDICINE

## 2019-08-06 PROCEDURE — 36415 COLL VENOUS BLD VENIPUNCTURE: CPT

## 2019-08-06 PROCEDURE — 83550 IRON BINDING TEST: CPT | Performed by: INTERNAL MEDICINE

## 2019-08-06 PROCEDURE — 82728 ASSAY OF FERRITIN: CPT | Performed by: INTERNAL MEDICINE

## 2019-08-06 PROCEDURE — 80053 COMPREHEN METABOLIC PANEL: CPT | Performed by: INTERNAL MEDICINE

## 2019-08-06 PROCEDURE — 83540 ASSAY OF IRON: CPT | Performed by: INTERNAL MEDICINE

## 2019-08-06 PROCEDURE — 80061 LIPID PANEL: CPT | Performed by: INTERNAL MEDICINE

## 2019-08-06 RX ORDER — LORATADINE 10 MG/1
10 TABLET ORAL DAILY
Qty: 30 TABLET | Refills: 5 | Status: SHIPPED | OUTPATIENT
Start: 2019-08-06 | End: 2020-06-02 | Stop reason: SDUPTHER

## 2019-08-06 RX ORDER — NYSTATIN 100000 [USP'U]/G
POWDER TOPICAL 3 TIMES DAILY
Qty: 60 G | Refills: 3 | Status: SHIPPED | OUTPATIENT
Start: 2019-08-06 | End: 2020-09-03

## 2019-08-06 NOTE — PROGRESS NOTES
CC: f/u preventive health    History:  Fiona Clay is a 29 y.o. female who presents today for evaluation of the above problems.  She has been doing well without illness. Her menorrhagia has improved, though she has stopped her iron supplement. Intertrigo improved with powder. She woke up with sore throat and cough, but has had no fever.     ROS:  Review of Systems   Constitutional: Negative for chills and fever.   HENT: Positive for congestion and sore throat.    Eyes: Negative for visual disturbance.   Respiratory: Negative for cough and shortness of breath.    Cardiovascular: Negative for chest pain, palpitations and leg swelling.   Gastrointestinal: Negative for abdominal pain, constipation and nausea.   Endocrine: Negative for cold intolerance and heat intolerance.   Genitourinary: Negative for difficulty urinating and frequency.   Musculoskeletal: Negative for arthralgias and back pain.   Skin: Negative for rash.   Neurological: Negative for dizziness and headaches.   Psychiatric/Behavioral: Negative for dysphoric mood. The patient is not nervous/anxious.        Allergies   Allergen Reactions   • Depakote [Valproic Acid]    • Penicillins      Past Medical History:   Diagnosis Date   • Cerebral palsy (CMS/HCC)    • Hearing loss     slight   • Mental retardation    • Microcephaly (CMS/HCC)    • Poor eyesight    • Seizures (CMS/HCC)    • Stroke (CMS/HCC)    • Vision impairment      Past Surgical History:   Procedure Laterality Date   • TYMPANOSTOMY TUBE PLACEMENT       Family History   Problem Relation Age of Onset   • Alcohol abuse Mother    • Arthritis Mother    • Depression Mother    • No Known Problems Sister    • No Known Problems Brother    • No Known Problems Brother    • No Known Problems Sister    • No Known Problems Sister    • No Known Problems Sister    • Alcohol abuse Maternal Grandmother    • Arthritis Maternal Grandmother    • Cancer Maternal Grandmother    • Alcohol abuse Maternal  "Grandfather    • Arthritis Maternal Grandfather    • Depression Maternal Grandfather    • Diabetes Maternal Grandfather    • Ovarian cancer Maternal Aunt 49   • Endometrial cancer Maternal Aunt 49   • Colon cancer Neg Hx    • Breast cancer Neg Hx       reports that she has never smoked. She has never used smokeless tobacco. She reports that she does not drink alcohol or use drugs.      Current Outpatient Medications:   •  CloNIDine (CATAPRES) 0.1 MG tablet, Take 1 tablet by mouth At Night As Needed (sleep)., Disp: 30 tablet, Rfl: 5  •  nystatin (MYCOSTATIN) 614201 UNIT/GM powder, Apply  topically to the appropriate area as directed 3 (Three) Times a Day., Disp: 60 g, Rfl: 3  •  PHENobarbital (LUMINAL) 64.8 MG tablet, Take 1 tablet by mouth Daily. (Patient taking differently: Take 64.8 mg by mouth Daily. Dr. Lee), Disp: 30 tablet, Rfl: 2    OBJECTIVE:  /82 (BP Location: Left arm, Patient Position: Sitting, Cuff Size: Adult)   Pulse 82   Ht 149.9 cm (59\")   Wt 104 kg (229 lb 1.6 oz)   SpO2 94%   Breastfeeding? No   BMI 46.27 kg/m²    Physical Exam   Constitutional: She is oriented to person, place, and time. She appears well-developed and well-nourished. No distress.   HENT:   Head: Normocephalic and atraumatic.   Right Ear: External ear normal.   Left Ear: External ear normal.   Nose: Nose normal.   Mouth/Throat: Oropharynx is clear and moist. No oropharyngeal exudate.   Eyes: EOM are normal. No scleral icterus.   Neck: Normal range of motion. No tracheal deviation present.   Cardiovascular: Normal rate, regular rhythm and normal heart sounds.   No murmur heard.  Pulmonary/Chest: Effort normal and breath sounds normal. No accessory muscle usage. No respiratory distress. She has no wheezes.   Abdominal: Soft. Bowel sounds are normal. She exhibits no distension. There is no tenderness.   Musculoskeletal: Normal range of motion. She exhibits no edema.   Neurological: She is alert and oriented to person, " place, and time. Coordination and gait normal.   Skin: Skin is warm and dry. No cyanosis. Nails show no clubbing.   No jaundice   Psychiatric: She has a normal mood and affect. Her mood appears not anxious. She does not exhibit a depressed mood.       Assessment/Plan    Diagnoses and all orders for this visit:    Annual visit for general adult medical examination with abnormal findings  -     Comprehensive Metabolic Panel; Future  -     Hemoglobin A1c; Future  -     Lipid Panel; Future  Immunizations:      - Tetanus: Unknown or >10 years ago. Recommend to have at pharmacy or on injury.      - Influenza: Recommend yearly.      - Pneumovax: Once after age 65      - Prevnar: Once after age 65      - Shingrix: Once after age 60  CRC screening: Due at 50  Mammogram: Due at 50  PAP: she has never had a PAP test and has seen GYN. Will defer at this time.   DEXA: DEXA scan at 65    Candidal intertrigo  -     nystatin (MYCOSTATIN) 897654 UNIT/GM powder; Apply  topically to the appropriate area as directed 3 (Three) Times a Day.  Well controlled on nystatin.    Iron deficiency anemia secondary to inadequate dietary iron intake  Menorrhagia with regular cycle  -     CBC (No Diff); Future  -     Iron Profile; Future  -     Ferritin; Future  -     ferrous sulfate 324 (65 Fe) MG tablet delayed-release EC tablet; Take 1 tablet by mouth Daily With Breakfast.  Labs returned. Iron deficiency worsened. Will restart daily supplement.     Seizures (CMS/HCC)  -     Phenobarbital level; Future  On phenobarbital per Neurology. Check level. No seizures recently.     Class 3 severe obesity due to excess calories without serious comorbidity with body mass index (BMI) of 45.0 to 49.9 in adult (CMS/HCC)  Recommended attention to portion control and being careful about the types and timing of meals for the purpose of weight management.    Ataxic cerebral palsy (CMS/HCC)  Microcephaly (CMS/HCC)  Stable without any new symptoms.     Acute seasonal  allergic rhinitis due to pollen  -     loratadine (CLARITIN) 10 MG tablet; Take 1 tablet by mouth Daily.  Claritin to be used for allergies and sinus symptoms. If not improved in 7-10 days, recommend to contact the office.     An After Visit Summary was printed and given to the patient at discharge.  Return in about 1 year (around 8/6/2020) for Annual physical.         Vishal Fox D.O. 8/7/2019   Electronically signed.

## 2019-08-07 ENCOUNTER — TELEPHONE (OUTPATIENT)
Dept: INTERNAL MEDICINE | Facility: CLINIC | Age: 29
End: 2019-08-07

## 2019-08-07 RX ORDER — FERROUS SULFATE TAB EC 324 MG (65 MG FE EQUIVALENT) 324 (65 FE) MG
324 TABLET DELAYED RESPONSE ORAL
Qty: 30 TABLET | Refills: 11 | Status: SHIPPED | OUTPATIENT
Start: 2019-08-07 | End: 2020-12-18 | Stop reason: SDUPTHER

## 2019-08-07 NOTE — TELEPHONE ENCOUNTER
----- Message from Vishal Fox DO sent at 8/7/2019 12:33 AM CDT -----  Labs looking OK. Sugars and cholesterol higher than last year. She should be careful to watch diet to prevent progression toward diabetes and worsening cholesterol.     Iron levels have worsened again. I would like to restart iron, but she may do this just once daily. Sent to [facility]. Otherwise, no other changes.

## 2019-08-07 NOTE — TELEPHONE ENCOUNTER
Patient's caregiver Cathi informed of lab results.  Cathi informed patient needs to watch her diet to prevent progression to diabetes or worsening cholesterol level.  Cathi informed the patient has been prescribed an iron supplement to be taken once daily.

## 2019-08-27 RX ORDER — PHENOBARBITAL 64.8 MG/1
64.8 TABLET ORAL DAILY
Qty: 30 TABLET | Refills: 2 | OUTPATIENT
Start: 2019-08-27 | End: 2019-12-08 | Stop reason: SDUPTHER

## 2019-10-10 ENCOUNTER — OFFICE VISIT (OUTPATIENT)
Dept: NEUROLOGY | Facility: CLINIC | Age: 29
End: 2019-10-10

## 2019-10-10 VITALS
OXYGEN SATURATION: 96 % | WEIGHT: 229 LBS | BODY MASS INDEX: 46.16 KG/M2 | HEIGHT: 59 IN | SYSTOLIC BLOOD PRESSURE: 122 MMHG | HEART RATE: 71 BPM | DIASTOLIC BLOOD PRESSURE: 80 MMHG

## 2019-10-10 DIAGNOSIS — G80.4 ATAXIC CEREBRAL PALSY (HCC): ICD-10-CM

## 2019-10-10 PROCEDURE — 99213 OFFICE O/P EST LOW 20 MIN: CPT | Performed by: PHYSICIAN ASSISTANT

## 2019-10-10 RX ORDER — CLONIDINE HYDROCHLORIDE 0.1 MG/1
0.1 TABLET ORAL NIGHTLY PRN
Qty: 30 TABLET | Refills: 7 | Status: SHIPPED | OUTPATIENT
Start: 2019-10-10

## 2019-10-10 NOTE — PROGRESS NOTES
Subjective   Fiona Clay is a 29 y.o. female is here today for follow-up.    This patient with developmental delay, neurocognitive defects, microcephaly, seizure disorder, cerebral palsy has been doing reasonably well with no seizures.      Seizures    This is a chronic problem. Episode onset: Childhood onset, associated with neurocognitive defects likely cerebral palsy. The problem has not changed since onset.There were more than 10 seizures. The most recent episode lasted 30 to 120 seconds. Associated symptoms include headaches and visual disturbances. The episode was witnessed. Aura: Unknown. The seizure(s) had no focality.   Headache    This is a recurrent problem. The current episode started more than 1 month ago. The problem occurs intermittently. The problem has been resolved. The pain is located in the bilateral region. The pain does not radiate. The pain quality is not similar to prior headaches. The quality of the pain is described as aching. The pain is mild. Associated symptoms include hearing loss and weakness. Pertinent negatives include no seizures. Nothing aggravates the symptoms. She has tried acetaminophen and NSAIDs for the symptoms. The treatment provided significant relief.       The following portions of the patient's history were reviewed and updated as appropriate: allergies, current medications, past family history, past medical history, past social history, past surgical history and problem list.    Review of Systems   Constitutional: Negative for fatigue.   HENT: Positive for hearing loss. Negative for trouble swallowing.    Eyes: Positive for visual disturbance.   Respiratory: Negative.    Cardiovascular: Negative.    Gastrointestinal: Negative.    Endocrine: Negative.    Genitourinary: Positive for menstrual problem.   Musculoskeletal: Positive for gait problem.   Skin: Negative.    Allergic/Immunologic: Negative.    Neurological: Positive for weakness and headaches. Negative  for seizures.        Ataxia   Hematological: Negative.    Psychiatric/Behavioral: Negative for sleep disturbance. The patient is nervous/anxious.        Objective   Physical Exam   Constitutional: She is oriented to person, place, and time. Vital signs are normal. She appears well-developed and well-nourished. No distress.   HENT:   Head: Atraumatic.   Right Ear: External ear normal. Decreased hearing is noted.   Left Ear: External ear normal. Decreased hearing is noted.   Nose: Nose normal.   Mouth/Throat: Uvula is midline, oropharynx is clear and moist and mucous membranes are normal. Abnormal dentition.   Microcephaly   Eyes: Conjunctivae, EOM and lids are normal. Pupils are equal, round, and reactive to light. No scleral icterus.   Morphologically normal, she was too excited for funduscopic exam today   Neck: Trachea normal, normal range of motion and phonation normal. No JVD present. Carotid bruit is not present.   Cardiovascular: Normal rate and regular rhythm.   Pulmonary/Chest: Effort normal and breath sounds normal.   Musculoskeletal: Normal range of motion. She exhibits no edema or tenderness.        Lumbar back: Normal.   Lymphadenopathy:     She has no cervical adenopathy.   Neurological: She is alert and oriented to person, place, and time. She has normal strength. She displays abnormal reflex. She displays no atrophy and no tremor. A cranial nerve deficit is present. No sensory deficit. She exhibits abnormal muscle tone. Coordination and gait abnormal. GCS eye subscore is 4. GCS verbal subscore is 5. GCS motor subscore is 6.   Reflex Scores:       Tricep reflexes are 3+ on the right side and 2+ on the left side.       Bicep reflexes are 3+ on the right side and 2+ on the left side.       Brachioradialis reflexes are 3+ on the right side and 2+ on the left side.       Patellar reflexes are 3+ on the right side and 2+ on the left side.       Achilles reflexes are 3+ on the right side and 2+ on the left  side.  Diminished vision bilaterally and mannerisms consistent with diminished hearing bilaterally, not formally tested or evaluated today but at her known baseline.  Right-sided ataxia, mild increase in tone and reflexes on the right versus left with some general weakness on the right versus left.   Skin: Skin is warm, dry and intact.   Psychiatric: Her affect is labile. Her speech is delayed. She is hyperactive. Cognition and memory are impaired. She expresses impulsivity.   Mental status today is at baseline.  She is generally happy and engaging though quite superficial in conversation.   Nursing note and vitals reviewed.        Assessment/Plan   Fiona was seen today for seizures and headache.    Diagnoses and all orders for this visit:    Ataxic cerebral palsy (CMS/HCC)  -     cloNIDine (CATAPRES) 0.1 MG tablet; Take 1 tablet by mouth At Night As Needed (sleep).    Patient is neurologically stable.  No changes are made in her present medication regimen.  Concerns and questions are discussed with the family.  10 minutes of a 15-minute outpatient visit was spent in counseling and coordination of care.    Dictated utilizing Dragon dictation.

## 2019-12-09 RX ORDER — PHENOBARBITAL 64.8 MG/1
64.8 TABLET ORAL DAILY
Qty: 30 TABLET | Refills: 2 | OUTPATIENT
Start: 2019-12-09 | End: 2020-04-08

## 2020-04-09 RX ORDER — PHENOBARBITAL 64.8 MG/1
TABLET ORAL
Qty: 30 TABLET | Refills: 2 | OUTPATIENT
Start: 2020-04-09 | End: 2020-06-30

## 2020-06-02 ENCOUNTER — OFFICE VISIT (OUTPATIENT)
Dept: INTERNAL MEDICINE | Facility: CLINIC | Age: 30
End: 2020-06-02

## 2020-06-02 VITALS
SYSTOLIC BLOOD PRESSURE: 120 MMHG | HEIGHT: 59 IN | BODY MASS INDEX: 46.67 KG/M2 | OXYGEN SATURATION: 98 % | WEIGHT: 231.5 LBS | TEMPERATURE: 98.9 F | RESPIRATION RATE: 16 BRPM | DIASTOLIC BLOOD PRESSURE: 82 MMHG | HEART RATE: 72 BPM

## 2020-06-02 DIAGNOSIS — J30.1 ACUTE SEASONAL ALLERGIC RHINITIS DUE TO POLLEN: ICD-10-CM

## 2020-06-02 DIAGNOSIS — H60.312 ACUTE DIFFUSE OTITIS EXTERNA OF LEFT EAR: Primary | ICD-10-CM

## 2020-06-02 PROCEDURE — 99213 OFFICE O/P EST LOW 20 MIN: CPT | Performed by: INTERNAL MEDICINE

## 2020-06-02 RX ORDER — LORATADINE 10 MG/1
10 TABLET ORAL DAILY
Qty: 30 TABLET | Refills: 5 | Status: SHIPPED | OUTPATIENT
Start: 2020-06-02 | End: 2021-05-03

## 2020-06-02 RX ORDER — CIPROFLOXACIN AND DEXAMETHASONE 3; 1 MG/ML; MG/ML
4 SUSPENSION/ DROPS AURICULAR (OTIC) 2 TIMES DAILY
Qty: 7.5 ML | Refills: 0 | Status: SHIPPED | OUTPATIENT
Start: 2020-06-02 | End: 2020-06-09

## 2020-06-02 NOTE — PROGRESS NOTES
"CC: Left ear pain    History:  Fiona Clay is a 29 y.o. female   She notes left ear pain that began on Saturday.  She has not been swimming, but wonders if she got some water stuck in it.  She has not noticed any drainage, has no fever or chills, has no radiation of pain, but does note pain on manipulation of the ear itself.       ROS:  Review of Systems   Constitutional: Negative for chills and fever.   HENT: Positive for ear discharge and ear pain. Negative for congestion.    Respiratory: Negative for cough and shortness of breath.         reports that she has never smoked. She has never used smokeless tobacco. She reports that she does not drink alcohol or use drugs.      Current Outpatient Medications:   •  cloNIDine (CATAPRES) 0.1 MG tablet, Take 1 tablet by mouth At Night As Needed (sleep)., Disp: 30 tablet, Rfl: 7  •  ferrous sulfate 324 (65 Fe) MG tablet delayed-release EC tablet, Take 1 tablet by mouth Daily With Breakfast., Disp: 30 tablet, Rfl: 11  •  loratadine (CLARITIN) 10 MG tablet, Take 1 tablet by mouth Daily., Disp: 30 tablet, Rfl: 5  •  nystatin (MYCOSTATIN) 547556 UNIT/GM powder, Apply  topically to the appropriate area as directed 3 (Three) Times a Day., Disp: 60 g, Rfl: 3  •  PHENobarbital (LUMINAL) 64.8 MG tablet, TAKE 1 TABLET BY MOUTH EVERY DAY, Disp: 30 tablet, Rfl: 2      OBJECTIVE:  /82 (BP Location: Left arm, Patient Position: Sitting, Cuff Size: Adult)   Pulse 72   Temp 98.9 °F (37.2 °C)   Resp 16   Ht 149.9 cm (59\")   Wt 105 kg (231 lb 8 oz)   SpO2 98%   Breastfeeding No   BMI 46.76 kg/m²    Physical Exam   Constitutional: She is oriented to person, place, and time. She appears well-nourished. No distress.   HENT:   Head: Normocephalic and atraumatic.   Left Ear: There is drainage.   Irritated external canal on the left with discharge of a wet, tan material.    Eyes: EOM are normal. No scleral icterus.   Neurological: She is alert and oriented to person, place, " and time.       Assessment/Plan     Diagnoses and all orders for this visit:    Acute diffuse otitis externa of left ear  -     ciprofloxacin-dexamethasone (Ciprodex) 0.3-0.1 % otic suspension; Administer 4 drops into the left ear 2 (Two) Times a Day for 7 days.  Ciprodex to treat. Avoid getting water in the ear with swimming and showers.     Acute seasonal allergic rhinitis due to pollen  -     loratadine (CLARITIN) 10 MG tablet; Take 1 tablet by mouth Daily.  Refill claritin, which has done well for allergies.      An After Visit Summary was printed and given to the patient at discharge.  Return for Next scheduled follow up.          Vishal Fox D.O. 6/2/2020   Electronically signed.

## 2020-06-15 ENCOUNTER — OFFICE VISIT (OUTPATIENT)
Dept: NEUROLOGY | Facility: CLINIC | Age: 30
End: 2020-06-15

## 2020-06-15 VITALS
WEIGHT: 231 LBS | SYSTOLIC BLOOD PRESSURE: 122 MMHG | BODY MASS INDEX: 46.57 KG/M2 | HEIGHT: 59 IN | HEART RATE: 63 BPM | DIASTOLIC BLOOD PRESSURE: 78 MMHG | OXYGEN SATURATION: 98 %

## 2020-06-15 DIAGNOSIS — Q02 MICROCEPHALY (HCC): ICD-10-CM

## 2020-06-15 DIAGNOSIS — R56.9 SEIZURES (HCC): ICD-10-CM

## 2020-06-15 DIAGNOSIS — G80.4 ATAXIC CEREBRAL PALSY (HCC): Primary | ICD-10-CM

## 2020-06-15 PROCEDURE — 99213 OFFICE O/P EST LOW 20 MIN: CPT | Performed by: PHYSICIAN ASSISTANT

## 2020-06-15 NOTE — PROGRESS NOTES
Subjective   Fiona Clay is a 29 y.o. female is here today for follow-up.    This patient with developmental delay, neurocognitive defects, microcephaly, seizure disorder, cerebral palsy has been doing reasonably well with no seizures.  No new medical issues are reported today.    Seizures    This is a chronic problem. Episode onset: Childhood onset, associated with neurocognitive defects likely cerebral palsy. The problem has not changed since onset.There were more than 10 seizures. The most recent episode lasted 30 to 120 seconds. Associated symptoms include headaches and visual disturbances. The episode was witnessed. Aura: Unknown. The seizure(s) had no focality.       The following portions of the patient's history were reviewed and updated as appropriate: allergies, current medications, past family history, past medical history, past social history, past surgical history and problem list.    Review of Systems   Constitutional: Negative for fatigue.   HENT: Positive for hearing loss. Negative for trouble swallowing.    Eyes: Positive for visual disturbance.   Respiratory: Negative.    Cardiovascular: Negative.    Gastrointestinal: Negative.    Endocrine: Negative.    Genitourinary: Positive for menstrual problem.   Musculoskeletal: Positive for gait problem.   Skin: Negative.    Allergic/Immunologic: Negative.    Neurological: Positive for weakness and headaches. Negative for seizures.        Ataxia   Hematological: Negative.    Psychiatric/Behavioral: Negative for behavioral problems and sleep disturbance. The patient is nervous/anxious.        Objective   Physical Exam   Constitutional: She is oriented to person, place, and time. Vital signs are normal.   HENT:   Head: Microcephalic.   Right Ear: External ear normal. Decreased hearing is noted.   Left Ear: External ear normal. Decreased hearing is noted.   Nose: Nose normal.   Mouth/Throat: Uvula is midline, oropharynx is clear and moist and mucous  membranes are normal. Abnormal dentition.   Microcephaly   Eyes: Pupils are equal, round, and reactive to light. Conjunctivae, EOM and lids are normal. No scleral icterus.   Morphologically normal, she was too excited for funduscopic exam today   Neck: Trachea normal, normal range of motion and phonation normal. No JVD present. Carotid bruit is not present.   Cardiovascular: Normal rate and normal heart sounds.   Pulmonary/Chest: Effort normal and breath sounds normal.   Musculoskeletal: Normal range of motion. She exhibits no edema or tenderness.        Lumbar back: Normal.   Lymphadenopathy:     She has no cervical adenopathy.   Neurological: She is alert and oriented to person, place, and time. She has normal strength. She displays abnormal reflex. She displays no atrophy and no tremor. A cranial nerve deficit is present. No sensory deficit. She exhibits abnormal muscle tone. Coordination and gait abnormal. GCS eye subscore is 4. GCS verbal subscore is 5. GCS motor subscore is 6.   Reflex Scores:       Tricep reflexes are 3+ on the right side and 2+ on the left side.       Bicep reflexes are 3+ on the right side and 2+ on the left side.       Brachioradialis reflexes are 3+ on the right side and 2+ on the left side.       Patellar reflexes are 3+ on the right side and 2+ on the left side.       Achilles reflexes are 3+ on the right side and 2+ on the left side.  Diminished vision bilaterally and mannerisms consistent with diminished hearing bilaterally, not formally tested or evaluated today but at her known baseline.  Right-sided ataxia, mild increase in tone and reflexes on the right versus left with some general weakness on the right versus left.   Skin: Skin is warm, dry and intact.   Psychiatric: Her affect is labile. Her speech is delayed. She is hyperactive. Cognition and memory are impaired. She expresses impulsivity.   Mental status today is at baseline.  She is generally happy and engaging though quite  superficial in conversation.   Nursing note and vitals reviewed.        Assessment/Plan   Fiona was seen today for cerebral palsy.    Diagnoses and all orders for this visit:    Ataxic cerebral palsy (CMS/HCC)    Microcephaly (CMS/HCC)    Seizures (CMS/HCC)    Patient is neurologically stable.  Phenobarb dose and recommendations are discussed with the patient and family.  No issues are identified.  Phenobarb level has generally been between 7 and 10 over the last 5 years or more with no seizures.  No new medical issues are identified today.    Seizure precautions and instructions to contact us should there be seizure other concern.  10 minutes of a 15-minute outpatient visit was spent in counseling and coordination of care with the patient and her family today.    Dictated utilizing Dragon dictation.

## 2020-06-30 DIAGNOSIS — R56.9 SEIZURES (HCC): Primary | ICD-10-CM

## 2020-07-06 RX ORDER — PHENOBARBITAL 64.8 MG/1
TABLET ORAL
Qty: 30 TABLET | Refills: 2 | OUTPATIENT
Start: 2020-07-06 | End: 2020-12-21

## 2020-07-16 ENCOUNTER — TELEPHONE (OUTPATIENT)
Dept: INTERNAL MEDICINE | Facility: CLINIC | Age: 30
End: 2020-07-16

## 2020-07-16 DIAGNOSIS — H60.312 ACUTE DIFFUSE OTITIS EXTERNA OF LEFT EAR: Primary | ICD-10-CM

## 2020-07-16 RX ORDER — CIPROFLOXACIN AND DEXAMETHASONE 3; 1 MG/ML; MG/ML
4 SUSPENSION/ DROPS AURICULAR (OTIC) 2 TIMES DAILY
Qty: 7.5 ML | Refills: 0 | Status: SHIPPED | OUTPATIENT
Start: 2020-07-16 | End: 2020-07-23

## 2020-07-16 NOTE — TELEPHONE ENCOUNTER
Patients mom called she states that patients ear is bothering her again, and having sinus issues as well, was wondering if you would call something in.

## 2020-07-16 NOTE — TELEPHONE ENCOUNTER
The only number in patient's chart is the number for the caregiver.  I tried calling that number, no answer, I left a message for someone to call the office.

## 2020-08-27 RX ORDER — FERROUS SULFATE 325(65) MG
1 TABLET ORAL
Qty: 30 TABLET | Refills: 10 | Status: SHIPPED | OUTPATIENT
Start: 2020-08-27

## 2020-09-03 DIAGNOSIS — B37.2 CANDIDAL INTERTRIGO: ICD-10-CM

## 2020-09-03 RX ORDER — NYSTATIN 100000 [USP'U]/G
POWDER TOPICAL 3 TIMES DAILY
Qty: 60 G | Refills: 2 | Status: SHIPPED | OUTPATIENT
Start: 2020-09-03 | End: 2021-11-12 | Stop reason: SDUPTHER

## 2020-12-18 ENCOUNTER — OFFICE VISIT (OUTPATIENT)
Dept: INTERNAL MEDICINE | Facility: CLINIC | Age: 30
End: 2020-12-18

## 2020-12-18 ENCOUNTER — HOSPITAL ENCOUNTER (EMERGENCY)
Facility: HOSPITAL | Age: 30
Discharge: HOME | End: 2020-12-18
Admitting: FAMILY MEDICINE

## 2020-12-18 ENCOUNTER — HOSPITAL ENCOUNTER (OUTPATIENT)
Dept: GENERAL RADIOLOGY | Facility: HOSPITAL | Age: 30
Discharge: HOME | End: 2020-12-18

## 2020-12-18 VITALS
SYSTOLIC BLOOD PRESSURE: 136 MMHG | RESPIRATION RATE: 16 BRPM | DIASTOLIC BLOOD PRESSURE: 83 MMHG | TEMPERATURE: 98 F | HEART RATE: 76 BPM | WEIGHT: 240 LBS | HEIGHT: 59 IN | OXYGEN SATURATION: 100 % | BODY MASS INDEX: 48.38 KG/M2

## 2020-12-18 VITALS
DIASTOLIC BLOOD PRESSURE: 86 MMHG | HEART RATE: 69 BPM | TEMPERATURE: 96.9 F | WEIGHT: 241 LBS | RESPIRATION RATE: 16 BRPM | SYSTOLIC BLOOD PRESSURE: 126 MMHG | OXYGEN SATURATION: 99 % | BODY MASS INDEX: 48.59 KG/M2 | HEIGHT: 59 IN

## 2020-12-18 DIAGNOSIS — M79.601 RIGHT ARM PAIN: ICD-10-CM

## 2020-12-18 DIAGNOSIS — S52.501A CLOSED FRACTURE OF DISTAL END OF RIGHT RADIUS, UNSPECIFIED FRACTURE MORPHOLOGY, INITIAL ENCOUNTER: Primary | ICD-10-CM

## 2020-12-18 DIAGNOSIS — M79.601 RIGHT ARM PAIN: Primary | ICD-10-CM
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
## 2020-12-18 PROCEDURE — 73090 X-RAY EXAM OF FOREARM: CPT

## 2020-12-18 PROCEDURE — 99213 OFFICE O/P EST LOW 20 MIN: CPT | Performed by: NURSE PRACTITIONER

## 2020-12-18 PROCEDURE — 73110 X-RAY EXAM OF WRIST: CPT

## 2020-12-18 PROCEDURE — 99282 EMERGENCY DEPT VISIT SF MDM: CPT

## 2020-12-18 RX ORDER — HYDROCODONE BITARTRATE AND ACETAMINOPHEN 5; 325 MG/1; MG/1
1 TABLET ORAL EVERY 6 HOURS PRN
Qty: 12 TABLET | Refills: 0 | Status: SHIPPED | OUTPATIENT
Start: 2020-12-18 | End: 2020-12-21

## 2020-12-18 NOTE — PROGRESS NOTES
CC: right arm pain    History:  Fiona Clay is a 30 y.o. female who presents today for follow-up for evaluation of the above:  Patient presents today with complaints of right arm pain.  She states that she fell while ambulating and trying to catch a dog on Wednesday.  She tripped over a threshold.  She fell onto her right arm and her hand bent under.  She experienced pain at the time of the injury but she reports that her pain has been increasing since the accident.  She does have swelling of her right forearm wrist and hand.  She can make a fist but this elicits pain.  She does have pain with any range of motion of her wrist.  She is not currently wearing a brace.  She has been taking over-the-counter Tylenol Motrin for pain as needed with minimal control.      ROS:  Review of Systems   Constitutional: Negative for fatigue and unexpected weight change.   HENT: Negative.    Eyes: Negative.    Respiratory: Negative.    Cardiovascular: Negative.    Gastrointestinal: Negative for abdominal pain, constipation and diarrhea.   Endocrine: Negative.    Genitourinary: Negative for difficulty urinating, dyspareunia, genital sores, menstrual problem, pelvic pain, vaginal bleeding, vaginal discharge and vaginal pain.   Musculoskeletal: Positive for myalgias.   Skin: Negative.    Neurological: Negative.    Psychiatric/Behavioral: Negative.        Ms. Clay  reports that she has never smoked. She has never used smokeless tobacco. She reports that she does not drink alcohol or use drugs.      Current Outpatient Medications:   •  cloNIDine (CATAPRES) 0.1 MG tablet, Take 1 tablet by mouth At Night As Needed (sleep)., Disp: 30 tablet, Rfl: 7  •  ferrous sulfate 325 (65 FE) MG tablet, Take 1 tablet by mouth Daily With Breakfast., Disp: 30 tablet, Rfl: 10  •  loratadine (CLARITIN) 10 MG tablet, Take 1 tablet by mouth Daily., Disp: 30 tablet, Rfl: 5  •  nystatin (MYCOSTATIN) 128164 UNIT/GM powder, Apply  topically to the  "appropriate area as directed 3 (Three) Times a Day., Disp: 60 g, Rfl: 2  •  PHENobarbital (LUMINAL) 64.8 MG tablet, TAKE 1 TABLET BY MOUTH DAILY, Disp: 30 tablet, Rfl: 2      OBJECTIVE:  /86 (BP Location: Left arm, Patient Position: Sitting, Cuff Size: Large Adult)   Pulse 69   Temp 96.9 °F (36.1 °C) (Temporal)   Resp 16   Ht 149.9 cm (59\")   Wt 109 kg (241 lb)   SpO2 99%   BMI 48.68 kg/m²    Physical Exam  Constitutional:       General: She is not in acute distress.  Pulmonary:      Effort: No respiratory distress.   Musculoskeletal:      Right wrist: She exhibits decreased range of motion, tenderness and swelling.      Right hand: She exhibits decreased range of motion and swelling.   Neurological:      Mental Status: She is oriented to person, place, and time.   Psychiatric:         Mood and Affect: Mood normal.         Assessment/Plan    Diagnoses and all orders for this visit:    1. Right arm pain (Primary)  -     Cancel: XR Forearm 2 View Right (In Office)  -     XR Wrist 3+ View Right; Future  -     XR forearm 2 vw bilateral; Future    2. Fall, initial encounter    will further evaluate with imaging.  She has significant swelling and limited range of motion of her wrist and hand on the right side.  Her caregiver is encouraged to obtain a wrist brace from local pharmacy until fracture can be ruled out.  She may continue to use over-the-counter Tylenol or Motrin for pain relief.  She is encouraged to use RICE therapy.     An After Visit Summary was printed and given to the patient at discharge.  Return for Next scheduled follow up. Sooner if problems arise.          Melanie BARRAGAN. 12/18/2020   Electronically Signed  "

## 2020-12-19 NOTE — ED PROVIDER NOTES
Subjective   Patient is a pleasant 30-year-old female who reports that she fell on Wednesday trying to catch a dog.  She states that she landed on her right arm.  The patient is left-hand dominant.  She has had pain to her right wrist and forearm since that time.  The patient had an x-ray as an outpatient today that showed a distal radius fracture.  The patient was sent to the ER for further evaluation.      History provided by:  Patient   used: No    Arm Pain  Location:  Rt arm pain s/p fall 3 days ago  Severity:  Mild  Onset quality:  Sudden  Duration:  3 days  Timing:  Constant  Progression:  Unchanged  Chronicity:  New  Associated symptoms: no abdominal pain, no chest pain, no congestion, no cough, no diarrhea, no ear pain, no fatigue, no fever, no headaches, no loss of consciousness, no myalgias, no nausea, no rash, no rhinorrhea, no shortness of breath, no sore throat, no vomiting and no wheezing        Review of Systems   Constitutional: Negative for fatigue and fever.   HENT: Negative for congestion, ear pain, rhinorrhea and sore throat.    Respiratory: Negative for cough, shortness of breath and wheezing.    Cardiovascular: Negative for chest pain.   Gastrointestinal: Negative for abdominal pain, diarrhea, nausea and vomiting.   Musculoskeletal: Negative for myalgias.   Skin: Negative for rash.   Neurological: Negative for loss of consciousness and headaches.   All other systems reviewed and are negative.      Past Medical History:   Diagnosis Date   • Cerebral palsy (CMS/HCC)    • Hearing loss     slight   • Mental retardation    • Microcephaly (CMS/HCC)    • Poor eyesight    • Seizures (CMS/HCC)    • Stroke (CMS/HCC)    • Vision impairment        Allergies   Allergen Reactions   • Depakote [Valproic Acid]    • Penicillins        Past Surgical History:   Procedure Laterality Date   • TYMPANOSTOMY TUBE PLACEMENT         Family History   Problem Relation Age of Onset   • Alcohol abuse  Mother    • Arthritis Mother    • Depression Mother    • No Known Problems Sister    • No Known Problems Brother    • No Known Problems Brother    • No Known Problems Sister    • No Known Problems Sister    • No Known Problems Sister    • Alcohol abuse Maternal Grandmother    • Arthritis Maternal Grandmother    • Cancer Maternal Grandmother    • Alcohol abuse Maternal Grandfather    • Arthritis Maternal Grandfather    • Depression Maternal Grandfather    • Diabetes Maternal Grandfather    • Ovarian cancer Maternal Aunt 49   • Endometrial cancer Maternal Aunt 49   • Colon cancer Neg Hx    • Breast cancer Neg Hx        Social History     Socioeconomic History   • Marital status: Significant Other     Spouse name: Not on file   • Number of children: Not on file   • Years of education: Not on file   • Highest education level: Not on file   Tobacco Use   • Smoking status: Never Smoker   • Smokeless tobacco: Never Used   Substance and Sexual Activity   • Alcohol use: No   • Drug use: No   • Sexual activity: Never     Birth control/protection: None           Objective   Physical Exam  Vitals signs and nursing note reviewed.   Constitutional:       Appearance: Normal appearance.   HENT:      Head: Normocephalic and atraumatic.   Eyes:      Conjunctiva/sclera: Conjunctivae normal.   Cardiovascular:      Rate and Rhythm: Normal rate and regular rhythm.   Pulmonary:      Effort: Pulmonary effort is normal.      Breath sounds: Normal breath sounds.   Musculoskeletal:        Arms:    Skin:     General: Skin is warm and dry.      Capillary Refill: Capillary refill takes less than 2 seconds.   Neurological:      General: No focal deficit present.      Mental Status: She is alert.   Psychiatric:         Mood and Affect: Mood normal.         Procedures           ED Course  ED Course as of Dec 18 1845   Fri Dec 18, 2020   1734 Reviewed films with Dr. Marroquin; call placed to Dr. Bryant, orthopedics.     [LF]   1740 I have discussed the  patient's case with Dr. Bryant.  He is asked the patient be placed in a sugar tong splint and follow-up with him on Monday at 9 AM in his office.  The patient should be n.p.o. after midnight on Sunday.    [LF]   1844 I discussed the results with the patient and her mother.  They are agreeable plan of care.  Patient be given a short course of Norco for pain.  She has tried taking Tylenol Motrin at home without relief.  Patient's mother reports that she has taken Norco before and had no difficulties with this.  This time the patient be discharged home in stable condition. Advised to return to the ER as needed.     [LF]      ED Course User Index  [LF] Yaa Mckeon, APRN                                   No orders to display     Labs Reviewed - No data to display          MDM  Number of Diagnoses or Management Options  Closed fracture of distal end of right radius, unspecified fracture morphology, initial encounter: new and requires workup     Amount and/or Complexity of Data Reviewed  Tests in the radiology section of CPT®: ordered and reviewed    Patient Progress  Patient progress: stable      Final diagnoses:   Closed fracture of distal end of right radius, unspecified fracture morphology, initial encounter            Yaa Mckeon, APRN  12/18/20 [id number]

## 2020-12-20 DIAGNOSIS — S52.321A CLOSED DISPLACED TRANSVERSE FRACTURE OF SHAFT OF RIGHT RADIUS, INITIAL ENCOUNTER: Primary | ICD-10-CM

## 2020-12-21 DIAGNOSIS — R56.9 SEIZURES (HCC): ICD-10-CM

## 2020-12-22 RX ORDER — PHENOBARBITAL 64.8 MG/1
TABLET ORAL
Qty: 30 TABLET | Refills: 2 | Status: SHIPPED | OUTPATIENT
Start: 2020-12-22 | End: 2021-05-06

## 2021-05-03 DIAGNOSIS — J30.1 ACUTE SEASONAL ALLERGIC RHINITIS DUE TO POLLEN: ICD-10-CM

## 2021-05-03 RX ORDER — LORATADINE 10 MG/1
TABLET ORAL
Qty: 30 TABLET | Refills: 5 | Status: SHIPPED | OUTPATIENT
Start: 2021-05-03

## 2021-05-05 DIAGNOSIS — R56.9 SEIZURES (HCC): ICD-10-CM

## 2021-05-06 RX ORDER — PHENOBARBITAL 64.8 MG/1
TABLET ORAL
Qty: 30 TABLET | Refills: 2 | Status: SHIPPED | OUTPATIENT
Start: 2021-05-06 | End: 2021-08-23

## 2021-08-21 DIAGNOSIS — R56.9 SEIZURES (HCC): ICD-10-CM

## 2021-08-23 RX ORDER — PHENOBARBITAL 64.8 MG/1
TABLET ORAL
Qty: 30 TABLET | Refills: 0 | Status: SHIPPED | OUTPATIENT
Start: 2021-08-23 | End: 2021-10-18

## 2021-10-16 DIAGNOSIS — R56.9 SEIZURES (HCC): ICD-10-CM

## 2021-10-16 DIAGNOSIS — J30.1 ACUTE SEASONAL ALLERGIC RHINITIS DUE TO POLLEN: ICD-10-CM

## 2021-10-18 RX ORDER — PHENOBARBITAL 64.8 MG/1
TABLET ORAL
Qty: 30 TABLET | Refills: 0 | Status: SHIPPED | OUTPATIENT
Start: 2021-10-18 | End: 2021-11-15 | Stop reason: SDUPTHER

## 2021-10-18 RX ORDER — FERROUS SULFATE 325(65) MG
TABLET ORAL
Qty: 90 TABLET | Refills: 3 | OUTPATIENT
Start: 2021-10-18

## 2021-10-18 RX ORDER — LORATADINE 10 MG/1
TABLET ORAL
Qty: 90 TABLET | Refills: 1 | OUTPATIENT
Start: 2021-10-18

## 2021-11-12 ENCOUNTER — OFFICE VISIT (OUTPATIENT)
Dept: NEUROLOGY | Facility: CLINIC | Age: 31
End: 2021-11-12

## 2021-11-12 VITALS
HEART RATE: 78 BPM | BODY MASS INDEX: 49.8 KG/M2 | SYSTOLIC BLOOD PRESSURE: 130 MMHG | WEIGHT: 247 LBS | DIASTOLIC BLOOD PRESSURE: 74 MMHG | OXYGEN SATURATION: 99 % | HEIGHT: 59 IN

## 2021-11-12 DIAGNOSIS — B37.2 CANDIDAL INTERTRIGO: ICD-10-CM

## 2021-11-12 DIAGNOSIS — Q02 MICROCEPHALY (HCC): ICD-10-CM

## 2021-11-12 DIAGNOSIS — R56.9 SEIZURES (HCC): ICD-10-CM

## 2021-11-12 DIAGNOSIS — G80.4 ATAXIC CEREBRAL PALSY (HCC): Primary | ICD-10-CM

## 2021-11-12 PROCEDURE — 99214 OFFICE O/P EST MOD 30 MIN: CPT | Performed by: PHYSICIAN ASSISTANT

## 2021-11-12 RX ORDER — FERROUS SULFATE 325(65) MG
1 TABLET ORAL
Qty: 30 TABLET | Refills: 0 | Status: CANCELLED | OUTPATIENT
Start: 2021-11-12

## 2021-11-12 RX ORDER — NYSTATIN 100000 [USP'U]/G
POWDER TOPICAL 3 TIMES DAILY PRN
Qty: 60 G | Refills: 0 | Status: SHIPPED | OUTPATIENT
Start: 2021-11-12 | End: 2023-02-06 | Stop reason: SDUPTHER

## 2021-11-15 DIAGNOSIS — R56.9 SEIZURES (HCC): ICD-10-CM

## 2021-11-15 RX ORDER — PHENOBARBITAL 64.8 MG/1
64.8 TABLET ORAL DAILY
Qty: 30 TABLET | Refills: 2 | Status: SHIPPED | OUTPATIENT
Start: 2021-11-15 | End: 2022-03-25

## 2022-02-06 DIAGNOSIS — J30.1 ACUTE SEASONAL ALLERGIC RHINITIS DUE TO POLLEN: ICD-10-CM

## 2022-02-07 RX ORDER — FERROUS SULFATE 325(65) MG
1 TABLET ORAL
Qty: 90 TABLET | Refills: 3 | OUTPATIENT
Start: 2022-02-07

## 2022-02-07 RX ORDER — LORATADINE 10 MG/1
TABLET ORAL
Qty: 90 TABLET | Refills: 1 | OUTPATIENT
Start: 2022-02-07

## 2022-03-24 DIAGNOSIS — R56.9 SEIZURES: ICD-10-CM

## 2022-03-25 RX ORDER — PHENOBARBITAL 64.8 MG/1
TABLET ORAL
Qty: 30 TABLET | Refills: 2 | Status: SHIPPED | OUTPATIENT
Start: 2022-03-25 | End: 2022-07-05

## 2022-07-02 DIAGNOSIS — R56.9 SEIZURES: ICD-10-CM

## 2022-07-05 RX ORDER — PHENOBARBITAL 64.8 MG/1
TABLET ORAL
Qty: 30 TABLET | Refills: 2 | Status: SHIPPED | OUTPATIENT
Start: 2022-07-05 | End: 2022-11-17

## 2022-11-17 DIAGNOSIS — R56.9 SEIZURES: ICD-10-CM

## 2022-11-17 NOTE — TELEPHONE ENCOUNTER
Contacted Cathi and explained that Minda needs an appointment.  I will send a message requesting an appointment and call her after the appointment has been scheduled.  Unable to leave a message when I called to give her the appointment.

## 2022-11-18 RX ORDER — PHENOBARBITAL 64.8 MG/1
TABLET ORAL
Qty: 30 TABLET | Refills: 1 | Status: SHIPPED | OUTPATIENT
Start: 2022-11-18 | End: 2023-01-17

## 2022-12-17 ENCOUNTER — OFFICE VISIT (OUTPATIENT)
Dept: PRIMARY CARE CLINIC | Age: 32
End: 2022-12-17

## 2022-12-17 VITALS
SYSTOLIC BLOOD PRESSURE: 118 MMHG | WEIGHT: 239.8 LBS | OXYGEN SATURATION: 96 % | HEART RATE: 79 BPM | DIASTOLIC BLOOD PRESSURE: 80 MMHG | TEMPERATURE: 97 F

## 2022-12-17 DIAGNOSIS — J02.0 STREP PHARYNGITIS: Primary | ICD-10-CM

## 2022-12-17 LAB — S PYO AG THROAT QL: POSITIVE

## 2022-12-17 PROCEDURE — 99213 OFFICE O/P EST LOW 20 MIN: CPT | Performed by: NURSE PRACTITIONER

## 2022-12-17 PROCEDURE — 87880 STREP A ASSAY W/OPTIC: CPT | Performed by: NURSE PRACTITIONER

## 2022-12-17 RX ORDER — PHENOBARBITAL 15 MG/1
15 TABLET ORAL 2 TIMES DAILY
COMMUNITY

## 2022-12-17 RX ORDER — AZITHROMYCIN 250 MG/1
250 TABLET, FILM COATED ORAL SEE ADMIN INSTRUCTIONS
Qty: 6 TABLET | Refills: 0 | Status: SHIPPED | OUTPATIENT
Start: 2022-12-17 | End: 2022-12-22

## 2022-12-17 ASSESSMENT — ENCOUNTER SYMPTOMS
ABDOMINAL PAIN: 0
RESPIRATORY NEGATIVE: 1
COUGH: 0
GASTROINTESTINAL NEGATIVE: 1
SWOLLEN GLANDS: 0
SORE THROAT: 1
EYES NEGATIVE: 1
VOMITING: 0
ALLERGIC/IMMUNOLOGIC NEGATIVE: 1
CHANGE IN BOWEL HABIT: 0

## 2022-12-17 NOTE — PATIENT INSTRUCTIONS
Completely finish antibiotic. For females that are childbearing age: Antibiotics may decrease the effectiveness of your birth control. Be sure to use another form of birth control for the next 2 weeks. You are contagious for at least 24 hours after beginning antibiotics. Change toothbrush in 48 hours, and again in 1 week. No eating or drinking after others. Warm salt water gargles several times daily. Coolmist humidifier in bedroom. Tylenol or Motrin as needed.

## 2022-12-17 NOTE — PROGRESS NOTES
Rochelle Sevilla (:  1990) is a 28 y.o. female,Established patient, here for evaluation of the following chief complaint(s):  Pharyngitis (Patients aunt has strep throat)    Patient presents today complaining of sore throat. Her aunt is currently positive for strep pharyngitis. Explained that her rapid strep test was positive. I will prescribe an antibiotic. Care instructions discussed. Patient verbalized understanding and agrees to plan of care. ASSESSMENT/PLAN:  1. Strep pharyngitis  -     POCT rapid strep A  -     azithromycin (ZITHROMAX) 250 MG tablet; Take 1 tablet by mouth See Admin Instructions for 5 days 500mg on day 1 followed by 250mg on days 2 - 5, Disp-6 tablet, R-0Normal   Orders Placed This Encounter   Medications    azithromycin (ZITHROMAX) 250 MG tablet     Sig: Take 1 tablet by mouth See Admin Instructions for 5 days 500mg on day 1 followed by 250mg on days 2 - 5     Dispense:  6 tablet     Refill:  0        Return if symptoms worsen or fail to improve. Subjective   SUBJECTIVE/OBJECTIVE:  Pharyngitis  This is a new problem. The current episode started yesterday. The problem occurs constantly. The problem has been unchanged. Associated symptoms include a sore throat. Pertinent negatives include no abdominal pain, change in bowel habit, chills, congestion, coughing, fatigue, fever, headaches, myalgias, rash, swollen glands or vomiting. The symptoms are aggravated by swallowing. She has tried nothing for the symptoms. Review of Systems   Constitutional: Negative. Negative for chills, fatigue and fever. HENT:  Positive for sore throat. Negative for congestion. Eyes: Negative. Respiratory: Negative. Negative for cough. Cardiovascular: Negative. Gastrointestinal: Negative. Negative for abdominal pain, change in bowel habit and vomiting. Endocrine: Negative. Genitourinary: Negative. Musculoskeletal: Negative. Negative for myalgias. Skin: Negative. Negative for rash. Allergic/Immunologic: Negative. Neurological: Negative. Negative for headaches. Hematological: Negative. Psychiatric/Behavioral: Negative. Objective   Physical Exam  Vitals reviewed. HENT:      Right Ear: Tympanic membrane, ear canal and external ear normal.      Left Ear: Tympanic membrane, ear canal and external ear normal.      Nose:      Right Sinus: No maxillary sinus tenderness or frontal sinus tenderness. Left Sinus: No maxillary sinus tenderness or frontal sinus tenderness. Mouth/Throat:      Lips: Pink. Mouth: Mucous membranes are moist.      Pharynx: Posterior oropharyngeal erythema (beefy red) present. No oropharyngeal exudate. Cardiovascular:      Rate and Rhythm: Normal rate and regular rhythm. Heart sounds: Normal heart sounds. Pulmonary:      Effort: Pulmonary effort is normal.      Breath sounds: Normal breath sounds. Lymphadenopathy:      Head:      Right side of head: No submandibular or tonsillar adenopathy. Left side of head: No submandibular or tonsillar adenopathy. Cervical:      Right cervical: No superficial cervical adenopathy. Left cervical: No superficial cervical adenopathy. Skin:     General: Skin is warm and dry. Neurological:      Mental Status: She is alert. Patient Instructions     Completely finish antibiotic. For females that are childbearing age: Antibiotics may decrease the effectiveness of your birth control. Be sure to use another form of birth control for the next 2 weeks. You are contagious for at least 24 hours after beginning antibiotics. Change toothbrush in 48 hours, and again in 1 week. No eating or drinking after others. Warm salt water gargles several times daily. Coolmist humidifier in bedroom. Tylenol or Motrin as needed. An electronic signature was used to authenticate this note.     --Lisandro Meier, CALEB - DA     EMR Dragon/translation disclaimer: Much of this encounter note is an electronic transcription/translation of spoken language to printed text. The electronic translation of spoken language may be erroneous, or at times, nonsensical words or phrases may be inadvertently transcribed.   Although I have reviewed the note for such errors, some may still exist.

## 2023-01-17 DIAGNOSIS — R56.9 SEIZURES: ICD-10-CM

## 2023-01-17 RX ORDER — PHENOBARBITAL 64.8 MG/1
TABLET ORAL
Qty: 30 TABLET | Refills: 0 | Status: SHIPPED | OUTPATIENT
Start: 2023-01-17 | End: 2023-02-13 | Stop reason: SDUPTHER

## 2023-02-06 ENCOUNTER — OFFICE VISIT (OUTPATIENT)
Dept: NEUROLOGY | Facility: CLINIC | Age: 33
End: 2023-02-06
Payer: MEDICAID

## 2023-02-06 ENCOUNTER — LAB (OUTPATIENT)
Dept: LAB | Facility: HOSPITAL | Age: 33
End: 2023-02-06
Payer: MEDICAID

## 2023-02-06 VITALS
DIASTOLIC BLOOD PRESSURE: 84 MMHG | HEART RATE: 83 BPM | OXYGEN SATURATION: 99 % | WEIGHT: 244.4 LBS | HEIGHT: 59 IN | TEMPERATURE: 97.9 F | BODY MASS INDEX: 49.27 KG/M2 | SYSTOLIC BLOOD PRESSURE: 122 MMHG

## 2023-02-06 DIAGNOSIS — B37.2 CANDIDAL INTERTRIGO: ICD-10-CM

## 2023-02-06 DIAGNOSIS — Q02 MICROCEPHALY: ICD-10-CM

## 2023-02-06 DIAGNOSIS — R56.9 SEIZURES: Primary | ICD-10-CM

## 2023-02-06 DIAGNOSIS — D64.9 ANEMIA, UNSPECIFIED TYPE: ICD-10-CM

## 2023-02-06 DIAGNOSIS — D50.9 IRON DEFICIENCY ANEMIA, UNSPECIFIED IRON DEFICIENCY ANEMIA TYPE: ICD-10-CM

## 2023-02-06 DIAGNOSIS — G80.4 ATAXIC CEREBRAL PALSY: ICD-10-CM

## 2023-02-06 LAB
ALBUMIN SERPL-MCNC: 4.1 G/DL (ref 3.5–5.2)
ALBUMIN/GLOB SERPL: 1.5 G/DL
ALP SERPL-CCNC: 113 U/L (ref 39–117)
ALT SERPL W P-5'-P-CCNC: 18 U/L (ref 1–33)
ANION GAP SERPL CALCULATED.3IONS-SCNC: 7 MMOL/L (ref 5–15)
AST SERPL-CCNC: 19 U/L (ref 1–32)
BASOPHILS # BLD AUTO: 0.02 10*3/MM3 (ref 0–0.2)
BASOPHILS NFR BLD AUTO: 0.3 % (ref 0–1.5)
BILIRUB SERPL-MCNC: 0.2 MG/DL (ref 0–1.2)
BUN SERPL-MCNC: 11 MG/DL (ref 6–20)
BUN/CREAT SERPL: 20 (ref 7–25)
CALCIUM SPEC-SCNC: 9.3 MG/DL (ref 8.6–10.5)
CHLORIDE SERPL-SCNC: 106 MMOL/L (ref 98–107)
CO2 SERPL-SCNC: 26 MMOL/L (ref 22–29)
CREAT SERPL-MCNC: 0.55 MG/DL (ref 0.57–1)
DEPRECATED RDW RBC AUTO: 39.1 FL (ref 37–54)
EGFRCR SERPLBLD CKD-EPI 2021: 125.1 ML/MIN/1.73
EOSINOPHIL # BLD AUTO: 0.07 10*3/MM3 (ref 0–0.4)
EOSINOPHIL NFR BLD AUTO: 0.9 % (ref 0.3–6.2)
ERYTHROCYTE [DISTWIDTH] IN BLOOD BY AUTOMATED COUNT: 14.4 % (ref 12.3–15.4)
GLOBULIN UR ELPH-MCNC: 2.8 GM/DL
GLUCOSE SERPL-MCNC: 65 MG/DL (ref 65–99)
HCT VFR BLD AUTO: 37.5 % (ref 34–46.6)
HGB BLD-MCNC: 11.7 G/DL (ref 12–15.9)
IMM GRANULOCYTES # BLD AUTO: 0.01 10*3/MM3 (ref 0–0.05)
IMM GRANULOCYTES NFR BLD AUTO: 0.1 % (ref 0–0.5)
IRON 24H UR-MRATE: 32 MCG/DL (ref 37–145)
IRON SATN MFR SERPL: 8 % (ref 20–50)
LYMPHOCYTES # BLD AUTO: 2.49 10*3/MM3 (ref 0.7–3.1)
LYMPHOCYTES NFR BLD AUTO: 31.3 % (ref 19.6–45.3)
MCH RBC QN AUTO: 23.7 PG (ref 26.6–33)
MCHC RBC AUTO-ENTMCNC: 31.2 G/DL (ref 31.5–35.7)
MCV RBC AUTO: 75.9 FL (ref 79–97)
MONOCYTES # BLD AUTO: 0.7 10*3/MM3 (ref 0.1–0.9)
MONOCYTES NFR BLD AUTO: 8.8 % (ref 5–12)
NEUTROPHILS NFR BLD AUTO: 4.67 10*3/MM3 (ref 1.7–7)
NEUTROPHILS NFR BLD AUTO: 58.6 % (ref 42.7–76)
NRBC BLD AUTO-RTO: 0 /100 WBC (ref 0–0.2)
PHENOBARB SERPL-MCNC: 7.8 MCG/ML (ref 10–30)
PLATELET # BLD AUTO: 348 10*3/MM3 (ref 140–450)
PMV BLD AUTO: 10.1 FL (ref 6–12)
POTASSIUM SERPL-SCNC: 4 MMOL/L (ref 3.5–5.2)
PROT SERPL-MCNC: 6.9 G/DL (ref 6–8.5)
RBC # BLD AUTO: 4.94 10*6/MM3 (ref 3.77–5.28)
SODIUM SERPL-SCNC: 139 MMOL/L (ref 136–145)
TIBC SERPL-MCNC: 396 MCG/DL (ref 298–536)
TRANSFERRIN SERPL-MCNC: 266 MG/DL (ref 200–360)
WBC NRBC COR # BLD: 7.96 10*3/MM3 (ref 3.4–10.8)

## 2023-02-06 PROCEDURE — 80053 COMPREHEN METABOLIC PANEL: CPT | Performed by: PHYSICIAN ASSISTANT

## 2023-02-06 PROCEDURE — 82746 ASSAY OF FOLIC ACID SERUM: CPT | Performed by: PHYSICIAN ASSISTANT

## 2023-02-06 PROCEDURE — 82607 VITAMIN B-12: CPT | Performed by: PHYSICIAN ASSISTANT

## 2023-02-06 PROCEDURE — 36415 COLL VENOUS BLD VENIPUNCTURE: CPT | Performed by: PHYSICIAN ASSISTANT

## 2023-02-06 PROCEDURE — 80184 ASSAY OF PHENOBARBITAL: CPT | Performed by: PHYSICIAN ASSISTANT

## 2023-02-06 PROCEDURE — 83540 ASSAY OF IRON: CPT | Performed by: PHYSICIAN ASSISTANT

## 2023-02-06 PROCEDURE — 85025 COMPLETE CBC W/AUTO DIFF WBC: CPT | Performed by: PHYSICIAN ASSISTANT

## 2023-02-06 PROCEDURE — 84466 ASSAY OF TRANSFERRIN: CPT | Performed by: PHYSICIAN ASSISTANT

## 2023-02-06 PROCEDURE — 99214 OFFICE O/P EST MOD 30 MIN: CPT | Performed by: PHYSICIAN ASSISTANT

## 2023-02-06 RX ORDER — NYSTATIN 100000 [USP'U]/G
POWDER TOPICAL 3 TIMES DAILY PRN
Qty: 60 G | Refills: 6 | Status: SHIPPED | OUTPATIENT
Start: 2023-02-06

## 2023-02-06 RX ORDER — PHENOBARBITAL 64.8 MG/1
64.8 TABLET ORAL DAILY
Qty: 30 TABLET | Refills: 0 | Status: CANCELLED | OUTPATIENT
Start: 2023-02-06

## 2023-02-06 NOTE — PROGRESS NOTES
Subjective   Fiona Clay is a 32 y.o. female is here today for follow-up.    History of Present Illness     FIONA CLAY is a 32-year-old female who presents today for follow-up of cerebral palsy, microcephaly, epilepsy, multiple issues including developmental delay, visual impairment, and hearing impairment. She is accompanied by her caregiver, Cathi today.    Cerebral Palsy  The patient is doing well overall. She has not had any seizures since her last visit. Her caregiver states that she is no longer taking the clonidine at night. She typically goes to bed at midnight.    Anemia  The patient's caregiver states that she was on iron in the past, but she has not been on it in a while. She has noticed that the patient's dark circles are starting to come back.    The following portions of the patient's history were reviewed and updated as appropriate: allergies, current medications, past family history, past medical history, past social history, past surgical history and problem list.    Review of Systems:  A review of systems was performed, and positive findings are noted in the HPI.      Current Outpatient Medications:   •  loratadine (CLARITIN) 10 MG tablet, TAKE 1 TABLET BY MOUTH EVERY DAY (Patient taking differently: Take 10 mg by mouth Daily.), Disp: 30 tablet, Rfl: 5  •  nystatin (MYCOSTATIN) 766551 UNIT/GM powder, Apply  topically to the appropriate area as directed 3 (Three) Times a Day As Needed (as needed for rash)., Disp: 60 g, Rfl: 6  •  PHENobarbital (LUMINAL) 64.8 MG tablet, TAKE 1 TABLET BY MOUTH EVERY DAY, Disp: 30 tablet, Rfl: 0  •  cloNIDine (CATAPRES) 0.1 MG tablet, Take 1 tablet by mouth At Night As Needed (sleep)., Disp: 30 tablet, Rfl: 7  •  ferrous sulfate 325 (65 FE) MG tablet, Take 1 tablet by mouth Daily With Breakfast., Disp: 30 tablet, Rfl: 10     Objective   Physical Exam  Vitals and nursing note reviewed.   HENT:      Head:      Comments: Microcephalic     Right Ear:  Hearing and external ear normal.      Left Ear: Hearing and external ear normal.      Nose: Nose normal.      Mouth/Throat:      Pharynx: Oropharynx is clear.   Eyes:      General: Lids are normal. Vision grossly intact. Gaze aligned appropriately. No scleral icterus.     Extraocular Movements: Extraocular movements intact.      Conjunctiva/sclera: Conjunctivae normal.      Pupils: Pupils are equal, round, and reactive to light.      Visual Fields: Right eye visual fields normal and left eye visual fields normal.   Neck:      Vascular: No carotid bruit or JVD.      Trachea: Trachea and phonation normal.   Cardiovascular:      Rate and Rhythm: Normal rate.      Heart sounds: Normal heart sounds.   Pulmonary:      Effort: Pulmonary effort is normal.      Breath sounds: Normal breath sounds.   Musculoskeletal:      Cervical back: Normal range of motion.   Skin:     General: Skin is warm and dry.   Neurological:      Mental Status: She is alert. Mental status is at baseline.      GCS: GCS eye subscore is 4. GCS verbal subscore is 5. GCS motor subscore is 6.      Sensory: Sensation is intact.      Motor: Motor function is intact.      Deep Tendon Reflexes: Reflexes are normal and symmetric.      Comments: Gait and coordination are impaired   Psychiatric:         Attention and Perception: Attention and perception normal.         Mood and Affect: Mood and affect normal.         Speech: Speech normal.         Behavior: Behavior normal.         Thought Content: Thought content normal.         Cognition and Memory: Cognition and memory normal.         Judgment: Judgment normal.           Assessment & Plan   Diagnoses and all orders for this visit:    1. Seizures (HCC) (Primary)  -     CBC & Differential  -     Comprehensive Metabolic Panel  -     Phenobarbital Level    2. Ataxic cerebral palsy (HCC)    3. Microcephaly (HCC)    4. Candidal intertrigo  -     nystatin (MYCOSTATIN) 272172 UNIT/GM powder; Apply  topically to the  appropriate area as directed 3 (Three) Times a Day As Needed (as needed for rash).  Dispense: 60 g; Refill: 6    5. Iron deficiency anemia, unspecified iron deficiency anemia type  -     Iron and TIBC  -     Vitamin B12  -     Folate    6. Anemia, unspecified type  -     Vitamin B12  -     Folate      1. Cerebral palsy  - Continue present care including phenobarbital. Caregiver today wished to discuss any implications regarding withdraw phenobarbital or our opinion regarding this. At this point, I do not feel that FIONA BENEDICT is an appropriate candidate to withdraw phenobarbital at the risk outweigh the benefit with regard to this. They expressed understanding. See her back in follow-up as scheduled.    2. Microcephaly  - Continue present care including phenobarbital. Caregiver today wished to discuss any implication regarding withdraw phenobarbital or our opinion regarding this. At this point, I do not feel that Fiona is an appropriate candidate to withdraw phenobarbital at a risk outweigh the benefit with regard to this. They expressed understanding.               Transcribed from ambient dictation for TOM Conklin by Jennifer Barakat.  02/06/23   12:19 CST    Patient or patient representative verbalized consent to the visit recording.  I have personally performed the services described in this document as transcribed by the above individual, and it is both accurate and complete.

## 2023-02-07 LAB
FOLATE SERPL-MCNC: 17.5 NG/ML (ref 4.78–24.2)
VIT B12 BLD-MCNC: 379 PG/ML (ref 211–946)

## 2023-02-13 DIAGNOSIS — R56.9 SEIZURES: ICD-10-CM

## 2023-02-13 RX ORDER — PHENOBARBITAL 64.8 MG/1
64.8 TABLET ORAL DAILY
Qty: 30 TABLET | Refills: 2 | Status: SHIPPED | OUTPATIENT
Start: 2023-02-13

## 2023-06-05 ENCOUNTER — OFFICE VISIT (OUTPATIENT)
Dept: NEUROLOGY | Facility: CLINIC | Age: 33
End: 2023-06-05
Payer: MEDICAID

## 2023-06-05 VITALS
HEIGHT: 59 IN | SYSTOLIC BLOOD PRESSURE: 122 MMHG | DIASTOLIC BLOOD PRESSURE: 68 MMHG | WEIGHT: 243 LBS | OXYGEN SATURATION: 99 % | BODY MASS INDEX: 48.99 KG/M2 | HEART RATE: 70 BPM

## 2023-06-05 DIAGNOSIS — D50.9 IRON DEFICIENCY ANEMIA, UNSPECIFIED IRON DEFICIENCY ANEMIA TYPE: ICD-10-CM

## 2023-06-05 DIAGNOSIS — G80.4 ATAXIC CEREBRAL PALSY: Primary | ICD-10-CM

## 2023-06-05 DIAGNOSIS — Q02 MICROCEPHALY: ICD-10-CM

## 2023-06-05 DIAGNOSIS — R56.9 SEIZURES: ICD-10-CM

## 2023-06-05 PROCEDURE — 1159F MED LIST DOCD IN RCRD: CPT | Performed by: NURSE PRACTITIONER

## 2023-06-05 PROCEDURE — 99213 OFFICE O/P EST LOW 20 MIN: CPT | Performed by: NURSE PRACTITIONER

## 2023-06-05 PROCEDURE — 1160F RVW MEDS BY RX/DR IN RCRD: CPT | Performed by: NURSE PRACTITIONER

## 2023-06-05 NOTE — PROGRESS NOTES
Neurology Progress Note    Cheif Complaint:    Cerebral Palsy  Anemia    Subjective   History of Present Illness:  Fiona Clay is a 32 y.o. female who presents today for Cerebral Palsy.  She is routinely followed by Vishal Fox DO for primary care. She is present with her caregiver today.    Cerebral Palsy  She continues to do well.  She has not had any seizure activity and continues with phenobarbital without missed dosing.  She has not had any side effects with medications. She has not fallen and continues to ambulate well.  She, or her caregiver, has no new concerns today.    Anemia  She did have low iron studies from her last visit.  She is not on supplementation at this time.      Allergies:    Depakote [valproic acid] and Penicillins    Medications:  Current Outpatient Medications   Medication Sig Dispense Refill    Cetirizine HCl (ZYRTEC ALLERGY PO) Take  by mouth Daily.      nystatin (MYCOSTATIN) [id number] UNIT/GM powder Apply  topically to the appropriate area as directed 3 (Three) Times a Day As Needed (as needed for rash). 60 g 6    PHENobarbital (LUMINAL) 64.8 MG tablet Take 1 tablet by mouth Daily. 30 tablet 2     No current facility-administered medications for this visit.     Current outpatient and discharge medications have been reconciled for the patient.  Reviewed by: LAUREL Norton    Past Medical History:  Past Medical History:   Diagnosis Date    Cerebral palsy     Hearing loss     slight    Mental retardation     Microcephaly     Poor eyesight     Seizures     Stroke     Vision impairment      Past Surgical History:   Procedure Laterality Date    TYMPANOSTOMY TUBE PLACEMENT       Family History   Problem Relation Age of Onset    Alcohol abuse Mother     Arthritis Mother     Depression Mother     No Known Problems Sister     No Known Problems Brother     No Known Problems Brother     No Known Problems Sister     No Known Problems Sister     No Known Problems Sister      "Alcohol abuse Maternal Grandmother     Arthritis Maternal Grandmother     Cancer Maternal Grandmother     Alcohol abuse Maternal Grandfather     Arthritis Maternal Grandfather     Depression Maternal Grandfather     Diabetes Maternal Grandfather     Ovarian cancer Maternal Aunt 49    Endometrial cancer Maternal Aunt 49    Colon cancer Neg Hx     Breast cancer Neg Hx      Social History     Tobacco Use    Smoking status: Never    Smokeless tobacco: Never   Substance Use Topics    Alcohol use: No    Drug use: No     Review of Systems   Neurological:  Negative for dizziness, tremors, seizures, speech difficulty and weakness.       Objective   Vital Signs:  Heart Rate:  [70] 70  BP: (122)/(68) 122/68      06/05/23  1122   Weight: 110 kg (243 lb)     149.9 cm (59\")  Body mass index is 49.08 kg/m².    Physical Exam  Vitals reviewed.   Constitutional:       General: She is awake.      Appearance: Normal appearance. She is obese.   HENT:      Head: Normocephalic.      Mouth/Throat:      Pharynx: Oropharynx is clear.   Eyes:      General: Lids are normal.      Extraocular Movements: Extraocular movements intact.      Pupils: Pupils are equal, round, and reactive to light.   Cardiovascular:      Rate and Rhythm: Normal rate and regular rhythm.      Pulses: Normal pulses.   Pulmonary:      Effort: Pulmonary effort is normal.   Musculoskeletal:         General: Normal range of motion.      Cervical back: Normal range of motion and neck supple.   Skin:     General: Skin is warm and dry.      Capillary Refill: Capillary refill takes less than 2 seconds.   Neurological:      Mental Status: She is alert.      Motor: Motor strength is normal.      Coordination: Coordination is intact.      Deep Tendon Reflexes: Reflexes are normal and symmetric.   Psychiatric:         Mood and Affect: Mood normal.         Speech: Speech normal.     Neurological Exam  Mental Status  Awake and alert. Level of consciousness: Baseline mental status. " Recent and remote memory are intact. Speech is normal. Language is fluent with no aphasia. Attention and concentration are normal.    Cranial Nerves  CN II: Visual acuity is normal. Visual fields full to confrontation.  CN III, IV, VI: Extraocular movements intact bilaterally. Normal lids and orbits bilaterally. Pupils equal round and reactive to light bilaterally.  CN V: Facial sensation is normal.  CN VII: Full and symmetric facial movement.  CN IX, X: Palate elevates symmetrically. Normal gag reflex.  CN XI: Shoulder shrug strength is normal.  CN XII: Tongue midline without atrophy or fasciculations.    Motor   Strength is 5/5 throughout all four extremities.    Sensory  Sensation is intact to light touch, pinprick, vibration and proprioception in all four extremities.    Reflexes  Deep tendon reflexes are 2+ and symmetric in all four extremities.    Coordination    Finger-to-nose, rapid alternating movements and heel-to-shin normal bilaterally without dysmetria.    Gait  Casual gait: Ataxic gait.    Results Review:    Lab Results   Component Value Date    GLUCOSE 65 02/06/2023    BUN 11 02/06/2023    CREATININE 0.55 (L) 02/06/2023    EGFRIFNONA 116 08/06/2019    BCR 20.0 02/06/2023    K 4.0 02/06/2023    CO2 26.0 02/06/2023    CALCIUM 9.3 02/06/2023    ALBUMIN 4.1 02/06/2023    AST 19 02/06/2023    ALT 18 02/06/2023     Lab Results   Component Value Date    WBC 7.96 02/06/2023    HGB 11.7 (L) 02/06/2023    HCT 37.5 02/06/2023    MCV 75.9 (L) 02/06/2023     02/06/2023     Lab Results   Component Value Date    CHOL 178 08/06/2019    TRIG 210 (H) 08/06/2019    HDL 41 (L) 08/06/2019     (H) 08/06/2019     No results found for: TSH  Lab Results   Component Value Date    HGBA1C 5.2 08/06/2019     Lab Results   Component Value Date    FOLATE 17.50 02/06/2023     Lab Results   Component Value Date    CLISDGBV08 379 02/06/2023       Chart Review:  Office Visit with Anirudh Lee PA (02/06/2023)       Plan .  Impression:  Fiona Clay is a 32 y.o. female who presents for follow-up of cerebral palsy and anemia.  She presents doing well without any new seizures.  She continues phenobarbital 64.8 mg daily without any side effects or missed doses.  She continues to do well and there are no complaints from her or her caregiver regarding this.  She did have iron profile which showed low iron levels and continues without supplementation.  I recommend that she get with her primary care provider regarding this.  They will get with him sometime soon to determine if supplementation is beneficial for her.  Otherwise have no recommendations for changes.  We will get her back on Jalyn schedule for her next visit.    Plan:  Continue phenobarbital 64.8 mg daily  Seizure precautions  Get with primary care provider for anemia  Call with any concerns.    The patient and I have discussed the plan of care and she is in full agreement at this time.     Follow-Up:  Return in about 4 months (around 10/5/2023) for With Deshawn Ryder, LAUREL  06/05/23  13:04 CDT

## 2023-06-10 DIAGNOSIS — R56.9 SEIZURES: ICD-10-CM

## 2023-06-12 RX ORDER — PHENOBARBITAL 64.8 MG/1
TABLET ORAL
Qty: 30 TABLET | Refills: 2 | Status: SHIPPED | OUTPATIENT
Start: 2023-06-12

## 2023-09-11 ENCOUNTER — OFFICE VISIT (OUTPATIENT)
Dept: PRIMARY CARE CLINIC | Age: 33
End: 2023-09-11

## 2023-09-11 VITALS
DIASTOLIC BLOOD PRESSURE: 64 MMHG | HEART RATE: 80 BPM | SYSTOLIC BLOOD PRESSURE: 120 MMHG | WEIGHT: 231 LBS | TEMPERATURE: 97.3 F | OXYGEN SATURATION: 96 % | RESPIRATION RATE: 18 BRPM

## 2023-09-11 DIAGNOSIS — J06.9 VIRAL URI: Primary | ICD-10-CM

## 2023-09-11 PROCEDURE — 99213 OFFICE O/P EST LOW 20 MIN: CPT | Performed by: NURSE PRACTITIONER

## 2023-09-11 ASSESSMENT — ENCOUNTER SYMPTOMS
COLOR CHANGE: 0
EYE ITCHING: 0
NAUSEA: 0
ABDOMINAL PAIN: 0
SINUS PRESSURE: 0
VOMITING: 0
BLOOD IN STOOL: 0
CONSTIPATION: 0
WHEEZING: 0
RHINORRHEA: 0
SORE THROAT: 1
COUGH: 1
DIARRHEA: 0
EYE DISCHARGE: 0
SHORTNESS OF BREATH: 0

## 2023-09-17 DIAGNOSIS — R56.9 SEIZURES: ICD-10-CM

## 2023-09-19 RX ORDER — PHENOBARBITAL 64.8 MG/1
TABLET ORAL
Qty: 30 TABLET | Refills: 2 | Status: SHIPPED | OUTPATIENT
Start: 2023-09-19